# Patient Record
Sex: FEMALE | Employment: OTHER | URBAN - METROPOLITAN AREA
[De-identification: names, ages, dates, MRNs, and addresses within clinical notes are randomized per-mention and may not be internally consistent; named-entity substitution may affect disease eponyms.]

---

## 2020-11-13 ENCOUNTER — HOSPITAL ENCOUNTER (INPATIENT)
Facility: HOSPITAL | Age: 85
LOS: 1 days | Discharge: HOME HEALTH | DRG: 689 | End: 2020-11-14
Attending: HOSPITALIST | Admitting: HOSPITALIST
Payer: MEDICARE

## 2020-11-13 ENCOUNTER — ANESTHESIA EVENT (OUTPATIENT)
Dept: SURGERY | Facility: HOSPITAL | Age: 85
DRG: 689 | End: 2020-11-13
Payer: MEDICARE

## 2020-11-13 ENCOUNTER — ANESTHESIA (OUTPATIENT)
Dept: SURGERY | Facility: HOSPITAL | Age: 85
DRG: 689 | End: 2020-11-13
Payer: MEDICARE

## 2020-11-13 DIAGNOSIS — R10.9 ABDOMINAL PAIN: ICD-10-CM

## 2020-11-13 DIAGNOSIS — N20.1 LEFT URETERAL STONE: Primary | ICD-10-CM

## 2020-11-13 DIAGNOSIS — N20.0 KIDNEY STONE: ICD-10-CM

## 2020-11-13 DIAGNOSIS — I10 HYPERTENSION, UNSPECIFIED TYPE: ICD-10-CM

## 2020-11-13 DIAGNOSIS — N13.8 URINARY TRACT OBSTRUCTION BY KIDNEY STONE: ICD-10-CM

## 2020-11-13 DIAGNOSIS — E11.9 TYPE 2 DIABETES MELLITUS WITHOUT COMPLICATION, WITHOUT LONG-TERM CURRENT USE OF INSULIN: ICD-10-CM

## 2020-11-13 DIAGNOSIS — A41.9 SEPSIS, DUE TO UNSPECIFIED ORGANISM, UNSPECIFIED WHETHER ACUTE ORGAN DYSFUNCTION PRESENT: ICD-10-CM

## 2020-11-13 DIAGNOSIS — N20.0 URINARY TRACT OBSTRUCTION BY KIDNEY STONE: ICD-10-CM

## 2020-11-13 DIAGNOSIS — N12 PYELONEPHRITIS OF LEFT KIDNEY: ICD-10-CM

## 2020-11-13 DIAGNOSIS — N13.4 HYDROURETER ON LEFT: ICD-10-CM

## 2020-11-13 PROBLEM — J96.01 ACUTE RESPIRATORY FAILURE WITH HYPOXIA: Status: ACTIVE | Noted: 2020-11-13

## 2020-11-13 PROBLEM — R06.02 SHORTNESS OF BREATH: Status: ACTIVE | Noted: 2020-11-13

## 2020-11-13 LAB
ALBUMIN SERPL BCP-MCNC: 3.3 G/DL (ref 3.5–5.2)
ALP SERPL-CCNC: 50 U/L (ref 55–135)
ALT SERPL W/O P-5'-P-CCNC: 25 U/L (ref 10–44)
ANION GAP SERPL CALC-SCNC: 9 MMOL/L (ref 8–16)
AST SERPL-CCNC: 25 U/L (ref 10–40)
BASOPHILS # BLD AUTO: 0.07 K/UL (ref 0–0.2)
BASOPHILS NFR BLD: 0.3 % (ref 0–1.9)
BILIRUB SERPL-MCNC: 0.8 MG/DL (ref 0.1–1)
BILIRUB UR QL STRIP: NEGATIVE
BUN SERPL-MCNC: 23 MG/DL (ref 8–23)
CALCIUM SERPL-MCNC: 8.8 MG/DL (ref 8.7–10.5)
CHLORIDE SERPL-SCNC: 102 MMOL/L (ref 95–110)
CLARITY UR REFRACT.AUTO: ABNORMAL
CO2 SERPL-SCNC: 28 MMOL/L (ref 23–29)
COLOR UR AUTO: YELLOW
CREAT SERPL-MCNC: 1.4 MG/DL (ref 0.5–1.4)
DIFFERENTIAL METHOD: ABNORMAL
EOSINOPHIL # BLD AUTO: 0.1 K/UL (ref 0–0.5)
EOSINOPHIL NFR BLD: 0.3 % (ref 0–8)
ERYTHROCYTE [DISTWIDTH] IN BLOOD BY AUTOMATED COUNT: 12.6 % (ref 11.5–14.5)
EST. GFR  (AFRICAN AMERICAN): 39 ML/MIN/1.73 M^2
EST. GFR  (NON AFRICAN AMERICAN): 33.8 ML/MIN/1.73 M^2
ESTIMATED AVG GLUCOSE: 134 MG/DL (ref 68–131)
GLUCOSE SERPL-MCNC: 135 MG/DL (ref 70–110)
GLUCOSE UR QL STRIP: NEGATIVE
HBA1C MFR BLD HPLC: 6.3 % (ref 4–5.6)
HCT VFR BLD AUTO: 41.4 % (ref 37–48.5)
HGB BLD-MCNC: 13.8 G/DL (ref 12–16)
HGB UR QL STRIP: ABNORMAL
IMM GRANULOCYTES # BLD AUTO: 0.13 K/UL (ref 0–0.04)
IMM GRANULOCYTES NFR BLD AUTO: 0.6 % (ref 0–0.5)
INR PPP: 1 (ref 0.8–1.2)
KETONES UR QL STRIP: NEGATIVE
LACTATE SERPL-SCNC: 2.1 MMOL/L (ref 0.5–2.2)
LEUKOCYTE ESTERASE UR QL STRIP: ABNORMAL
LYMPHOCYTES # BLD AUTO: 0.8 K/UL (ref 1–4.8)
LYMPHOCYTES NFR BLD: 3.6 % (ref 18–48)
MCH RBC QN AUTO: 32 PG (ref 27–31)
MCHC RBC AUTO-ENTMCNC: 33.3 G/DL (ref 32–36)
MCV RBC AUTO: 96 FL (ref 82–98)
MICROSCOPIC COMMENT: ABNORMAL
MONOCYTES # BLD AUTO: 1.2 K/UL (ref 0.3–1)
MONOCYTES NFR BLD: 5.3 % (ref 4–15)
NEUTROPHILS # BLD AUTO: 19.5 K/UL (ref 1.8–7.7)
NEUTROPHILS NFR BLD: 89.9 % (ref 38–73)
NITRITE UR QL STRIP: NEGATIVE
NRBC BLD-RTO: 0 /100 WBC
PH UR STRIP: 5 [PH] (ref 5–8)
PLATELET # BLD AUTO: 148 K/UL (ref 150–350)
PMV BLD AUTO: 11.7 FL (ref 9.2–12.9)
POCT GLUCOSE: 116 MG/DL (ref 70–110)
POCT GLUCOSE: 135 MG/DL (ref 70–110)
POCT GLUCOSE: 98 MG/DL (ref 70–110)
POTASSIUM SERPL-SCNC: 3.6 MMOL/L (ref 3.5–5.1)
PROCALCITONIN SERPL IA-MCNC: 4.51 NG/ML
PROT SERPL-MCNC: 6.4 G/DL (ref 6–8.4)
PROT UR QL STRIP: NEGATIVE
PROTHROMBIN TIME: 11.1 SEC (ref 9–12.5)
RBC # BLD AUTO: 4.31 M/UL (ref 4–5.4)
RBC #/AREA URNS AUTO: 62 /HPF (ref 0–4)
SARS-COV-2 RDRP RESP QL NAA+PROBE: NEGATIVE
SODIUM SERPL-SCNC: 139 MMOL/L (ref 136–145)
SP GR UR STRIP: 1.02 (ref 1–1.03)
SQUAMOUS #/AREA URNS AUTO: 1 /HPF
TROPONIN I SERPL DL<=0.01 NG/ML-MCNC: 0.1 NG/ML (ref 0–0.03)
TROPONIN I SERPL DL<=0.01 NG/ML-MCNC: 0.12 NG/ML (ref 0–0.03)
URN SPEC COLLECT METH UR: ABNORMAL
WBC # BLD AUTO: 21.69 K/UL (ref 3.9–12.7)
WBC #/AREA URNS AUTO: 80 /HPF (ref 0–5)

## 2020-11-13 PROCEDURE — 25000003 PHARM REV CODE 250: Performed by: STUDENT IN AN ORGANIZED HEALTH CARE EDUCATION/TRAINING PROGRAM

## 2020-11-13 PROCEDURE — 93010 EKG 12-LEAD: ICD-10-PCS | Mod: ,,, | Performed by: INTERNAL MEDICINE

## 2020-11-13 PROCEDURE — C1758 CATHETER, URETERAL: HCPCS | Performed by: UROLOGY

## 2020-11-13 PROCEDURE — 99223 PR INITIAL HOSPITAL CARE,LEVL III: ICD-10-PCS | Mod: ,,, | Performed by: HOSPITALIST

## 2020-11-13 PROCEDURE — 36000707: Performed by: UROLOGY

## 2020-11-13 PROCEDURE — 52005 CYSTO W/URTRL CATHJ: CPT | Mod: ,,, | Performed by: UROLOGY

## 2020-11-13 PROCEDURE — D9220A PRA ANESTHESIA: ICD-10-PCS | Mod: ANES,,, | Performed by: ANESTHESIOLOGY

## 2020-11-13 PROCEDURE — D9220A PRA ANESTHESIA: ICD-10-PCS | Mod: CRNA,,, | Performed by: NURSE ANESTHETIST, CERTIFIED REGISTERED

## 2020-11-13 PROCEDURE — 37000009 HC ANESTHESIA EA ADD 15 MINS: Performed by: UROLOGY

## 2020-11-13 PROCEDURE — 63600175 PHARM REV CODE 636 W HCPCS: Performed by: STUDENT IN AN ORGANIZED HEALTH CARE EDUCATION/TRAINING PROGRAM

## 2020-11-13 PROCEDURE — 87040 BLOOD CULTURE FOR BACTERIA: CPT | Mod: 59

## 2020-11-13 PROCEDURE — 36415 COLL VENOUS BLD VENIPUNCTURE: CPT

## 2020-11-13 PROCEDURE — 85610 PROTHROMBIN TIME: CPT

## 2020-11-13 PROCEDURE — 52005 PR CYSTOURETHROSCOPY,URETER CATHETER: ICD-10-PCS | Mod: ,,, | Performed by: UROLOGY

## 2020-11-13 PROCEDURE — 85025 COMPLETE CBC W/AUTO DIFF WBC: CPT

## 2020-11-13 PROCEDURE — 82365 CALCULUS SPECTROSCOPY: CPT

## 2020-11-13 PROCEDURE — 71000016 HC POSTOP RECOV ADDL HR: Performed by: UROLOGY

## 2020-11-13 PROCEDURE — 81001 URINALYSIS AUTO W/SCOPE: CPT

## 2020-11-13 PROCEDURE — 93005 ELECTROCARDIOGRAM TRACING: CPT

## 2020-11-13 PROCEDURE — 82962 GLUCOSE BLOOD TEST: CPT | Performed by: UROLOGY

## 2020-11-13 PROCEDURE — 84145 PROCALCITONIN (PCT): CPT

## 2020-11-13 PROCEDURE — 71000044 HC DOSC ROUTINE RECOVERY FIRST HOUR: Performed by: UROLOGY

## 2020-11-13 PROCEDURE — 83605 ASSAY OF LACTIC ACID: CPT

## 2020-11-13 PROCEDURE — 80053 COMPREHEN METABOLIC PANEL: CPT

## 2020-11-13 PROCEDURE — 37000008 HC ANESTHESIA 1ST 15 MINUTES: Performed by: UROLOGY

## 2020-11-13 PROCEDURE — 99223 1ST HOSP IP/OBS HIGH 75: CPT | Mod: ,,, | Performed by: HOSPITALIST

## 2020-11-13 PROCEDURE — 74420 PR  X-RAY RETROGRADE PYELOGRAM: ICD-10-PCS | Mod: 26,,, | Performed by: UROLOGY

## 2020-11-13 PROCEDURE — 84484 ASSAY OF TROPONIN QUANT: CPT

## 2020-11-13 PROCEDURE — 93010 ELECTROCARDIOGRAM REPORT: CPT | Mod: ,,, | Performed by: INTERNAL MEDICINE

## 2020-11-13 PROCEDURE — 36000706: Performed by: UROLOGY

## 2020-11-13 PROCEDURE — 71000015 HC POSTOP RECOV 1ST HR: Performed by: UROLOGY

## 2020-11-13 PROCEDURE — 20600001 HC STEP DOWN PRIVATE ROOM

## 2020-11-13 PROCEDURE — U0002 COVID-19 LAB TEST NON-CDC: HCPCS

## 2020-11-13 PROCEDURE — D9220A PRA ANESTHESIA: Mod: CRNA,,, | Performed by: NURSE ANESTHETIST, CERTIFIED REGISTERED

## 2020-11-13 PROCEDURE — C1769 GUIDE WIRE: HCPCS | Performed by: UROLOGY

## 2020-11-13 PROCEDURE — D9220A PRA ANESTHESIA: Mod: ANES,,, | Performed by: ANESTHESIOLOGY

## 2020-11-13 PROCEDURE — 25000003 PHARM REV CODE 250: Performed by: NURSE ANESTHETIST, CERTIFIED REGISTERED

## 2020-11-13 PROCEDURE — 87086 URINE CULTURE/COLONY COUNT: CPT

## 2020-11-13 PROCEDURE — 83036 HEMOGLOBIN GLYCOSYLATED A1C: CPT

## 2020-11-13 PROCEDURE — 84484 ASSAY OF TROPONIN QUANT: CPT | Mod: 91

## 2020-11-13 PROCEDURE — 63600175 PHARM REV CODE 636 W HCPCS: Performed by: NURSE ANESTHETIST, CERTIFIED REGISTERED

## 2020-11-13 PROCEDURE — 74420 UROGRAPHY RTRGR +-KUB: CPT | Mod: 26,,, | Performed by: UROLOGY

## 2020-11-13 RX ORDER — IBUPROFEN 200 MG
400 TABLET ORAL
COMMUNITY

## 2020-11-13 RX ORDER — GLUCAGON 1 MG
1 KIT INJECTION
Status: DISCONTINUED | OUTPATIENT
Start: 2020-11-13 | End: 2020-11-14 | Stop reason: HOSPADM

## 2020-11-13 RX ORDER — FENTANYL CITRATE 50 UG/ML
INJECTION, SOLUTION INTRAMUSCULAR; INTRAVENOUS
Status: DISCONTINUED | OUTPATIENT
Start: 2020-11-13 | End: 2020-11-13

## 2020-11-13 RX ORDER — POLYETHYLENE GLYCOL 3350 17 G/17G
17 POWDER, FOR SOLUTION ORAL DAILY
Status: DISCONTINUED | OUTPATIENT
Start: 2020-11-13 | End: 2020-11-14 | Stop reason: HOSPADM

## 2020-11-13 RX ORDER — FENTANYL CITRATE 50 UG/ML
25 INJECTION, SOLUTION INTRAMUSCULAR; INTRAVENOUS EVERY 5 MIN PRN
Status: DISCONTINUED | OUTPATIENT
Start: 2020-11-13 | End: 2020-11-13 | Stop reason: HOSPADM

## 2020-11-13 RX ORDER — SODIUM CHLORIDE 0.9 % (FLUSH) 0.9 %
10 SYRINGE (ML) INJECTION
Status: DISCONTINUED | OUTPATIENT
Start: 2020-11-13 | End: 2020-11-14 | Stop reason: HOSPADM

## 2020-11-13 RX ORDER — MONTELUKAST SODIUM 10 MG/1
10 TABLET ORAL NIGHTLY
COMMUNITY

## 2020-11-13 RX ORDER — GABAPENTIN 300 MG/1
300 CAPSULE ORAL DAILY
COMMUNITY

## 2020-11-13 RX ORDER — VERAPAMIL HYDROCHLORIDE 180 MG/1
180 TABLET, FILM COATED, EXTENDED RELEASE ORAL DAILY
COMMUNITY

## 2020-11-13 RX ORDER — AMOXICILLIN 500 MG
CAPSULE ORAL DAILY
COMMUNITY

## 2020-11-13 RX ORDER — PROPOFOL 10 MG/ML
VIAL (ML) INTRAVENOUS CONTINUOUS PRN
Status: DISCONTINUED | OUTPATIENT
Start: 2020-11-13 | End: 2020-11-13

## 2020-11-13 RX ORDER — SODIUM CHLORIDE 9 MG/ML
INJECTION, SOLUTION INTRAVENOUS CONTINUOUS
Status: ACTIVE | OUTPATIENT
Start: 2020-11-13 | End: 2020-11-13

## 2020-11-13 RX ORDER — DICLOFENAC SODIUM 10 MG/G
4 GEL TOPICAL 3 TIMES DAILY
Status: DISCONTINUED | OUTPATIENT
Start: 2020-11-13 | End: 2020-11-14 | Stop reason: HOSPADM

## 2020-11-13 RX ORDER — PRAVASTATIN SODIUM 20 MG/1
20 TABLET ORAL DAILY
COMMUNITY

## 2020-11-13 RX ORDER — IBUPROFEN 200 MG
16 TABLET ORAL
Status: DISCONTINUED | OUTPATIENT
Start: 2020-11-13 | End: 2020-11-14 | Stop reason: HOSPADM

## 2020-11-13 RX ORDER — TRIAMTERENE AND HYDROCHLOROTHIAZIDE 75; 50 MG/1; MG/1
1 TABLET ORAL DAILY
COMMUNITY

## 2020-11-13 RX ORDER — ONDANSETRON 2 MG/ML
4 INJECTION INTRAMUSCULAR; INTRAVENOUS DAILY PRN
Status: DISCONTINUED | OUTPATIENT
Start: 2020-11-13 | End: 2020-11-13 | Stop reason: HOSPADM

## 2020-11-13 RX ORDER — ASPIRIN 81 MG/1
81 TABLET ORAL DAILY
COMMUNITY

## 2020-11-13 RX ORDER — PROPOFOL 10 MG/ML
VIAL (ML) INTRAVENOUS
Status: DISCONTINUED | OUTPATIENT
Start: 2020-11-13 | End: 2020-11-13

## 2020-11-13 RX ORDER — CETIRIZINE HYDROCHLORIDE 5 MG/1
5 TABLET ORAL DAILY PRN
Status: DISCONTINUED | OUTPATIENT
Start: 2020-11-13 | End: 2020-11-14 | Stop reason: HOSPADM

## 2020-11-13 RX ORDER — ACETAMINOPHEN 500 MG
1000 TABLET ORAL NIGHTLY
COMMUNITY

## 2020-11-13 RX ORDER — INSULIN ASPART 100 [IU]/ML
0-5 INJECTION, SOLUTION INTRAVENOUS; SUBCUTANEOUS
Status: DISCONTINUED | OUTPATIENT
Start: 2020-11-13 | End: 2020-11-14 | Stop reason: HOSPADM

## 2020-11-13 RX ORDER — GLYBURIDE 2.5 MG/1
2.5 TABLET ORAL DAILY
COMMUNITY

## 2020-11-13 RX ORDER — ONDANSETRON 2 MG/ML
4 INJECTION INTRAMUSCULAR; INTRAVENOUS EVERY 8 HOURS PRN
Status: DISCONTINUED | OUTPATIENT
Start: 2020-11-13 | End: 2020-11-14 | Stop reason: HOSPADM

## 2020-11-13 RX ORDER — LIDOCAINE HCL/PF 100 MG/5ML
SYRINGE (ML) INTRAVENOUS
Status: DISCONTINUED | OUTPATIENT
Start: 2020-11-13 | End: 2020-11-13

## 2020-11-13 RX ORDER — IBUPROFEN 200 MG
24 TABLET ORAL
Status: DISCONTINUED | OUTPATIENT
Start: 2020-11-13 | End: 2020-11-14 | Stop reason: HOSPADM

## 2020-11-13 RX ORDER — GABAPENTIN 300 MG/1
300 CAPSULE ORAL 3 TIMES DAILY
Status: DISCONTINUED | OUTPATIENT
Start: 2020-11-13 | End: 2020-11-14 | Stop reason: HOSPADM

## 2020-11-13 RX ORDER — DIPHENHYDRAMINE HCL 25 MG
25 CAPSULE ORAL
COMMUNITY

## 2020-11-13 RX ORDER — ACETAMINOPHEN 325 MG/1
650 TABLET ORAL EVERY 4 HOURS PRN
Status: DISCONTINUED | OUTPATIENT
Start: 2020-11-13 | End: 2020-11-14 | Stop reason: HOSPADM

## 2020-11-13 RX ADMIN — DICLOFENAC 4 G: 10 GEL TOPICAL at 10:11

## 2020-11-13 RX ADMIN — ACETAMINOPHEN 650 MG: 325 TABLET ORAL at 05:11

## 2020-11-13 RX ADMIN — PIPERACILLIN AND TAZOBACTAM 4.5 G: 4; .5 INJECTION, POWDER, LYOPHILIZED, FOR SOLUTION INTRAVENOUS; PARENTERAL at 10:11

## 2020-11-13 RX ADMIN — GABAPENTIN 300 MG: 300 CAPSULE ORAL at 10:11

## 2020-11-13 RX ADMIN — PROPOFOL 10 MG: 10 INJECTION, EMULSION INTRAVENOUS at 03:11

## 2020-11-13 RX ADMIN — PIPERACILLIN AND TAZOBACTAM 4.5 G: 4; .5 INJECTION, POWDER, LYOPHILIZED, FOR SOLUTION INTRAVENOUS; PARENTERAL at 03:11

## 2020-11-13 RX ADMIN — LIDOCAINE HYDROCHLORIDE 20 MG: 20 INJECTION, SOLUTION INTRAVENOUS at 03:11

## 2020-11-13 RX ADMIN — SODIUM CHLORIDE: 0.9 INJECTION, SOLUTION INTRAVENOUS at 05:11

## 2020-11-13 RX ADMIN — PROPOFOL 100 MCG/KG/MIN: 10 INJECTION, EMULSION INTRAVENOUS at 03:11

## 2020-11-13 RX ADMIN — GABAPENTIN 300 MG: 300 CAPSULE ORAL at 09:11

## 2020-11-13 RX ADMIN — PIPERACILLIN AND TAZOBACTAM 4.5 G: 4; .5 INJECTION, POWDER, LYOPHILIZED, FOR SOLUTION INTRAVENOUS; PARENTERAL at 05:11

## 2020-11-13 RX ADMIN — FENTANYL CITRATE 25 MCG: 50 INJECTION, SOLUTION INTRAMUSCULAR; INTRAVENOUS at 03:11

## 2020-11-13 RX ADMIN — POLYETHYLENE GLYCOL 3350 17 G: 17 POWDER, FOR SOLUTION ORAL at 09:11

## 2020-11-13 RX ADMIN — PROPOFOL 20 MG: 10 INJECTION, EMULSION INTRAVENOUS at 03:11

## 2020-11-13 RX ADMIN — GUAIFENESIN AND DEXTROMETHORPHAN HYDROBROMIDE 1 TABLET: 600; 30 TABLET, EXTENDED RELEASE ORAL at 10:11

## 2020-11-13 NOTE — ASSESSMENT & PLAN NOTE
On arrival to floor, hypoxic to 92%, up to mid 94% on 1L NC. Trop 0.103, however EKG in sinus becka with 1st deg AV block without ST elevations. Likely 2/2 demand ischemia given hypotension at OSH. Procal elevated 4.57. CXR w/o concern for PNA.  Not requiring supplemental O2 to maintain adequate saturation at discharge.    Resolved.

## 2020-11-13 NOTE — HPI
Aidan Santa is an 87 y.o. female with a history of HTN, DM, and HLD who presented to [place] on 11/12/20 with LLQ and L flank pain over the past few days.  Patient states that she has had a 1 day history of sharp, intermittent left flank pain radiating to the LLQ. She has had fevers but no chills. She endorses nausea but no vomiting. CT a/p w/ contrast showed a 3 mm left UVJ stone with proximal hydroureteronephrosis w/ delayed nephrogram and concern for forniceal rupture. At [facility], WBC 16, Cr 1.2, lactate 3.2, trop 0.026, UA negative for nitrites and showed 15 RBC/hpf, 30 WBC/hpf, moderate bacteria, and occasional squams. She then started to become hypotensive and did respond to fluids but then eventually required levophed initiation. She quickly improved and was taken off levophed and at the time transfer acceptance. She was also given a dose of ceftriaxone. She was transferred to [facility] on 11/13 for urology evaluation. At [facility], her WBC is up to 21.7, Cr stable, lactate normal, trop up to 0.103, and procal 4.51. She is currently afebrile and hemodynamically stable. Last took ASA 81 yesterday.

## 2020-11-13 NOTE — ASSESSMENT & PLAN NOTE
"Mrs. Aidan Santa is a 87-year-old-woman with T2DM, HTN, HLD, and asthma, who initially presented to [facility] in [place], [place] with 10/10 LLQ abdominal pain and L flank pain.    Per transfer note: At OSH,"Work up revealed on CT a 3 mm obstructing stone in the L UVJ and perinephric fluids that is concerning for recent rupture of the calyx or renal pelvis. She then started to become hypotensive and did respond to fluids but then eventually required levophed initiation. She quickly improved and was taken off levophed and at the time of this transfer acceptance she has been off levophed for about 1.5 hrs with BPs maintaining in the 120s and HRs in the 70s-80s range. The case was also discussed with eicu and it was felt that she would be stable for a stepdown bed at this time. She was also given a dose of ceftriaxone. Patient overall appears well. Urology on call aware and will consult."    Resolved.    - Blood and urine cx: no growth   - IV Zosyn 11/13 - 11/14  - Ciprofloxacin for 7 days post discharge  - No urologic intervention required  "

## 2020-11-13 NOTE — SUBJECTIVE & OBJECTIVE
No past medical history on file.    No past surgical history on file.    Review of patient's allergies indicates:   Allergen Reactions    Codeine Other (See Comments)     dizzy       Family History     None          Tobacco Use    Smoking status: Not on file   Substance and Sexual Activity    Alcohol use: Not on file    Drug use: Not on file    Sexual activity: Not on file       Review of Systems   Constitutional: Positive for chills. Negative for appetite change and fever.   HENT: Negative.    Eyes: Negative.    Respiratory: Positive for shortness of breath (Chronic). Negative for chest tightness.    Cardiovascular: Negative for chest pain and palpitations.   Gastrointestinal: Positive for abdominal pain and nausea. Negative for vomiting.   Genitourinary: Positive for flank pain. Negative for decreased urine volume and difficulty urinating.   Musculoskeletal: Negative for arthralgias and gait problem.   Skin: Negative for color change and rash.   Neurological: Negative for dizziness and headaches.   Psychiatric/Behavioral: Negative for agitation and confusion.       Objective:     Temp:  [98.3 °F (36.8 °C)-99 °F (37.2 °C)] 98.3 °F (36.8 °C)  Pulse:  [] 82  Resp:  [19-22] 20  SpO2:  [92 %-95 %] 94 %  BP: ()/(50-68) 150/68     Body mass index is 33.35 kg/m².           Drains     None                 Physical Exam  Vitals signs and nursing note reviewed.   Constitutional:       Appearance: Normal appearance.   HENT:      Head: Normocephalic and atraumatic.      Comments: On nasal cannula     Mouth/Throat:      Mouth: Mucous membranes are moist.   Eyes:      Pupils: Pupils are equal, round, and reactive to light.   Cardiovascular:      Rate and Rhythm: Normal rate.   Pulmonary:      Effort: Pulmonary effort is normal. No respiratory distress.   Abdominal:      General: Abdomen is flat. There is no distension.      Tenderness: There is abdominal tenderness (LLQ). There is left CVA tenderness.   Skin:      General: Skin is warm and dry.   Neurological:      General: No focal deficit present.      Mental Status: She is alert and oriented to person, place, and time.   Psychiatric:         Mood and Affect: Mood normal.         Behavior: Behavior normal.         Thought Content: Thought content normal.         Judgment: Judgment normal.         Significant Labs:    BMP:  Recent Labs   Lab 11/13/20 0455      K 3.6      CO2 28   BUN 23   CREATININE 1.4   CALCIUM 8.8       CBC:  Recent Labs   Lab 11/13/20 0455   WBC 21.69*   HGB 13.8   HCT 41.4   *       All pertinent labs results from the past 24 hours have been reviewed.    Significant Imaging: See HPI

## 2020-11-13 NOTE — ASSESSMENT & PLAN NOTE
She takes Triamterene-HCTZ 75-50 mg daily and verapamil 180 mg daily for hypertension.     - Held during admission

## 2020-11-13 NOTE — OP NOTE
[facility]  Operative Note    Date: 11/13/2020    Pre-Op Diagnosis: left ureteral stone, UTI    Patient Active Problem List    Diagnosis Date Noted    Urinary tract obstruction by kidney stone 11/13/2020    Left ureteral stone 11/13/2020    Diabetes 11/13/2020    Shortness of breath 11/13/2020    Hypertension 11/13/2020         Post-Op Diagnosis: same (stone passed prior to procedure)    Procedure(s) Performed:  1. Cystoscopy with left RGP  2. Fluoro < 1 h    Specimen(s): none    Staff Surgeon: Rolando Arellano MD    Assistant Surgeon: Mahamed Jama MD; Jennifer Pendleton MD    Anesthesia: Monitored Local Anesthesia with Sedation    Indications: Aidan Santa is a 87 y.o. female with left ureteral stone and UTI, admitted to Hospital Medicine     Findings:   - stone found when prepping patient's vulva  - left RPG showed no filling defects; stone therefore has presumably passed, presumably was the stone identified when prepping patient  - multiple small brown lesions in bladder consistent with cystitis cystica visualized    Estimated Blood Loss: min    Drains: none    Specimen: left ureteral stone    Procedure in Detail:  After risks, benefits and possible complications of the procedure were explained, the patient elected to undergo the procedure and informed consent was obtained. All questions were answered in the sanjuana-operative area. The patient was transferred to the cystoscopy suite and placed in the supine position. SCDs were applied and working. Anesthesia was administered. Once the patient was adequately sedated, she was placed in the dorsal lithotomy position and prepped and draped in the usual sterile fashion. While prepping the patient, a stone was seen adherent to the vulva. This was sent for analysis. Time out was performed, sanjuana-procedural antibiotics were confirmed.     A rigid cystoscope in a 22 Fr sheath was introduced into the bladder per urethra. This passed easily. The  entire urethra was visualized which showed no masses or strictures. The right and left ureteral orifices were identified in the normal anatomic position and were seen effluxing clear urine. Formal cystoscopy using both 30 and 70 degree lens was performed which revealed no masses or lesions suspicious for malignancy, no trabeculations, no bladder stones and no bladder diverticuli. There were brown spots throughout the bladder consistent with cystitis cystica.    A 5 Fr open-ended catheter was used to perform a left RGP. This revealed no filling defects and delicate calyces. The bladder was drained, and the patient was removed from lithotomy.     The patient tolerated the procedure well and was transferred to recovery in stable condition.    Disposition: The patient will return to the care of the Hospital Medicine service.  She should continue treatment of her pyelonephritis, but now that her stone has passed there is no need for urologic intervention. Urology will sign off. Please contact with questions or concerns.     Mahamed Jama MD

## 2020-11-13 NOTE — TRANSFER OF CARE
"Anesthesia Transfer of Care Note    Patient: Aidan Santa    Procedure(s) Performed: Procedure(s) (LRB):  CYSTOSCOPY, WITH URETERAL STENT INSERTION (Left)  PYELOGRAM, RETROGRADE (Left)    Patient location: [facility]    Anesthesia Type: general    Transport from OR: Transported from OR on 6-10 L/min O2 by face mask with adequate spontaneous ventilation    Post pain: adequate analgesia    Post assessment: tolerated procedure well and no apparent anesthetic complications    Post vital signs: stable    Level of consciousness: awake, alert and oriented    Nausea/Vomiting: no nausea/vomiting    Complications: none    Transfer of care protocol was followed      Last vitals:   Visit Vitals  /72 (Patient Position: Lying)   Pulse 85   Temp 36.8 °C (98.2 °F) (Oral)   Resp 18   Ht 5' 5" (1.651 m)   Wt 90.9 kg (200 lb 6.4 oz)   SpO2 (!) 91%   BMI 33.35 kg/m²     "

## 2020-11-13 NOTE — PLAN OF CARE
POC reviewed with pt. Who verbalized understanding. AAOx 4. Remains free of falls and injuries. VSS. NPO, denies nausea. Pain controlled with Tylenol. Assist with activity. BG monitored at night time with no coverage needed. Belongings @ bedside. UA sent. XRAY. EKG. LABS.1L NC. IS @ bedside. Suction/O2 in room.No acute events. No distress noted. WCTM.

## 2020-11-13 NOTE — PLAN OF CARE
(Physician in Lead of Transfers)   Outside Transfer Acceptance Note / Regional Referral Center      Upon patient arrival to floor, please call extension [phone] (if no answer, this will flip to a beeper, so enter your call back number) for Hospital Medicine admit team assignment and for additional admit orders for the patient.  Do not page the attending physician associated with the patient on arrival (this physician may not be on duty at the time of arrival).  Rather, always call [phone] to reach the triage physician for orders and team assignment.      Transferring Physician: Dr. Fischer    Accepting Physician: Aayush Escalona MD    Date of Acceptance: 11/12/2020    Transferring Facility: [facility]     Reason for Transfer: needs urology evaluation     Report from Transferring Physician/Hospital course: The patient is an 86 y/o female with PMH of HTN, DM, and HLP who presented with LLQ and L flank pain over the past few days. Work up revealed on CT a 3 mm obstructing stone in the L UVJ and perinephric fluids that is concerning for recent rupture of the calyx or renal pelvis. She then started to become hypotensive and did respond to fluids but then eventually required levophed initiation. She quickly improved and was taken off levophed and at the time of this transfer acceptance she has been off levophed for about 1.5 hrs with BPs maintaining in the 120s and HRs in the 70s-80s range. The case was also discussed with eicu and it was felt that she would be stable for a stepdown bed at this time. She was also given a dose of ceftriaxone. Patient overall appears well. Urology on call aware and will consult.       Labs & Radiographs: see transfer records      To Do List:   1) Admit to stepdown bed  2) Will need rapid covid testing   3) Urology consult  4) Continue IVF and antibiotics      Aayush Escalona MD  Hospital Medicine Staff

## 2020-11-13 NOTE — PLAN OF CARE
POC reviewed with pt and daughters. Cystoscope today without intervention s/t renal stone passing on its own. Pain controlled. Diabetic diet. Pt ambulating with assistance, on 2L NC. Will continue to monitor.

## 2020-11-13 NOTE — ASSESSMENT & PLAN NOTE
Aidan Santa is an 87 y.o. female with a history of HTN, DM, and HLD who presented to [facility] on 11/12/20 due to 3 mm left UVJ stone and concern for obstructive pyelonephritis.    - Patient needs drainage of her left kidney and ureter.  - To OR today for cystoscopy with left ureteral stent placement, pending primary team clearance for general anesthesia given age and elevated troponins.  - Consent signed. I have explained the indications, risks, benefits, and alternatives of the procedure in detail. The patient voices understanding and all questions have been answered. The patient agrees to proceed as planned. She understands that there is a potential that if retrograde access is not possible, she would need a left nephrostomy tube.  - Keep NPO.  - Continue empiric ceftriaxone, follow up urine and blood cultures.  - Will discuss definitive management of her stone on an outpatient basis once infection is cleared.

## 2020-11-13 NOTE — CONSULTS
[facility]  Urology  Consult Note    Patient Name: Aidan Santa  MRN: [id number]  Admission Date: 11/13/2020  Hospital Length of Stay: 0   Code Status: Full Code   Attending Provider: Eli Thornton MD   Consulting Provider: Luke Khoury MD  Primary Care Physician: To Obtain Unable  Principal Problem:Left ureteral stone    Inpatient consult to Urology  Consult performed by: Luke Khoury MD  Consult ordered by: Alysha Bray MD        Subjective:     HPI:  Aidan Santa is an 87 y.o. female with a history of HTN, DM, and HLD who presented to [facility] on 11/12/20 with LLQ and L flank pain over the past few days.  Patient states that she has had a 1 day history of sharp, intermittent left flank pain radiating to the LLQ. She has had fevers but no chills. She endorses nausea but no vomiting. CT a/p w/ contrast showed a 3 mm left UVJ stone with proximal hydroureteronephrosis w/ delayed nephrogram and concern for forniceal rupture. At [facility], WBC 16, Cr 1.2, lactate 3.2, trop 0.026, UA negative for nitrites and showed 15 RBC/hpf, 30 WBC/hpf, moderate bacteria, and occasional squams. She then started to become hypotensive and did respond to fluids but then eventually required levophed initiation. She quickly improved and was taken off levophed and at the time transfer acceptance. She was also given a dose of ceftriaxone. She was transferred to [facility] on 11/13 for urology evaluation. At [facility], her WBC is up to 21.7, Cr stable, lactate normal, trop up to 0.103, and procal 4.51. She is currently afebrile and hemodynamically stable. Last took ASA 81 yesterday.    No past medical history on file.    No past surgical history on file.    Review of patient's allergies indicates:   Allergen Reactions    Codeine Other (See Comments)     dizzy       Family History     None          Tobacco Use    Smoking status: Not on file   Substance and Sexual Activity    Alcohol use: Not  on file    Drug use: Not on file    Sexual activity: Not on file       Review of Systems   Constitutional: Positive for chills. Negative for appetite change and fever.   HENT: Negative.    Eyes: Negative.    Respiratory: Positive for shortness of breath (Chronic). Negative for chest tightness.    Cardiovascular: Negative for chest pain and palpitations.   Gastrointestinal: Positive for abdominal pain and nausea. Negative for vomiting.   Genitourinary: Positive for flank pain. Negative for decreased urine volume and difficulty urinating.   Musculoskeletal: Negative for arthralgias and gait problem.   Skin: Negative for color change and rash.   Neurological: Negative for dizziness and headaches.   Psychiatric/Behavioral: Negative for agitation and confusion.       Objective:     Temp:  [98.3 °F (36.8 °C)-99 °F (37.2 °C)] 98.3 °F (36.8 °C)  Pulse:  [] 82  Resp:  [19-22] 20  SpO2:  [92 %-95 %] 94 %  BP: ()/(50-68) 150/68     Body mass index is 33.35 kg/m².           Drains     None                 Physical Exam  Vitals signs and nursing note reviewed.   Constitutional:       Appearance: Normal appearance.   HENT:      Head: Normocephalic and atraumatic.      Comments: On nasal cannula     Mouth/Throat:      Mouth: Mucous membranes are moist.   Eyes:      Pupils: Pupils are equal, round, and reactive to light.   Cardiovascular:      Rate and Rhythm: Normal rate.   Pulmonary:      Effort: Pulmonary effort is normal. No respiratory distress.   Abdominal:      General: Abdomen is flat. There is no distension.      Tenderness: There is abdominal tenderness (LLQ). There is left CVA tenderness.   Skin:     General: Skin is warm and dry.   Neurological:      General: No focal deficit present.      Mental Status: She is alert and oriented to person, place, and time.   Psychiatric:         Mood and Affect: Mood normal.         Behavior: Behavior normal.         Thought Content: Thought content normal.          Judgment: Judgment normal.         Significant Labs:    BMP:  Recent Labs   Lab 11/13/20  0455      K 3.6      CO2 28   BUN 23   CREATININE 1.4   CALCIUM 8.8       CBC:  Recent Labs   Lab 11/13/20  0455   WBC 21.69*   HGB 13.8   HCT 41.4   *       All pertinent labs results from the past 24 hours have been reviewed.    Significant Imaging: See HPI      Assessment and Plan:     * Left ureteral stone  Aidan Santa is an 87 y.o. female with a history of HTN, DM, and HLD who presented to [facility] on 11/12/20 due to 3 mm left UVJ stone and concern for obstructive pyelonephritis. She was transferred to [facility] on 11/13 for urology evaluation.    - Patient needs drainage of her left kidney and ureter.  - To OR today for cystoscopy with left ureteral stent placement, pending primary team clearance for general anesthesia given age and elevated troponins.  - Consent signed. I have explained the indications, risks, benefits, and alternatives of the procedure in detail. The patient voices understanding and all questions have been answered. The patient agrees to proceed as planned. She understands that there is a potential that if retrograde access is not possible, she would need a left nephrostomy tube.  - Keep NPO.  - Continue empiric ceftriaxone, follow up urine and blood cultures.  - Will discuss definitive management of her stone on an outpatient basis once infection is cleared.  - Will have outside imaging disc uploaded to Epic.      VTE Risk Mitigation (From admission, onward)         Ordered     IP VTE HIGH RISK PATIENT  Once      11/13/20 0519     Place sequential compression device  Until discontinued      11/13/20 0519                Thank you for your consult. I will follow-up with patient. Please contact us if you have any additional questions.    Luke Khoury MD  Urology  [facility]

## 2020-11-13 NOTE — SUBJECTIVE & OBJECTIVE
No past medical history on file.    No past surgical history on file.    Review of patient's allergies indicates:   Allergen Reactions    Codeine Other (See Comments)     dizzy       No current facility-administered medications on file prior to encounter.      Current Outpatient Medications on File Prior to Encounter   Medication Sig    aspirin (ECOTRIN) 81 MG EC tablet Take 81 mg by mouth once daily.    diphenhydrAMINE (BENADRYL) 25 mg capsule Take 25 mg by mouth as needed for Itching.    gabapentin (NEURONTIN) 300 MG capsule Take 300 mg by mouth 3 (three) times daily.    glyBURIDE (DIABETA) 2.5 MG tablet Take 2.5 mg by mouth 2 (two) times daily.    ibuprofen (ADVIL,MOTRIN) 200 MG tablet Take 200 mg by mouth as needed for Pain.    montelukast (SINGULAIR) 10 mg tablet Take 10 mg by mouth every evening.    omega-3 fatty acids/fish oil (FISH OIL-OMEGA-3 FATTY ACIDS) 300-1,000 mg capsule Take by mouth once daily.    pravastatin (PRAVACHOL) 20 MG tablet Take 20 mg by mouth once daily.    triamterene-hydrochlorothiazide 75-50 mg (MAXZIDE) 75-50 mg per tablet Take 1 tablet by mouth once daily.    verapamiL (VERELAN) 100 MG CPCT Take 180 mg by mouth once daily.     Family History     None        Tobacco Use    Smoking status: Not on file   Substance and Sexual Activity    Alcohol use: Not on file    Drug use: Not on file    Sexual activity: Not on file     Review of Systems   Constitutional: Positive for chills. Negative for fever.   HENT: Negative for congestion, rhinorrhea and sore throat.    Eyes: Negative for redness and visual disturbance.   Respiratory: Positive for shortness of breath. Negative for cough, wheezing and stridor.    Cardiovascular: Negative for chest pain and leg swelling.   Gastrointestinal: Positive for abdominal distention, abdominal pain (LLQ) and nausea. Negative for diarrhea and vomiting.   Endocrine: Negative for polyuria.   Genitourinary: Positive for flank pain (L) and frequency  (decreased). Negative for dysuria.   Musculoskeletal: Negative for arthralgias and back pain.   Skin: Negative for rash.   Neurological: Negative for weakness, light-headedness and headaches.   Hematological: Does not bruise/bleed easily.   Psychiatric/Behavioral: Negative for agitation and confusion.     Objective:     Vital Signs (Most Recent):  Temp: 98.3 °F (36.8 °C) (11/13/20 0348)  Pulse: 82 (11/13/20 0348)  Resp: 20 (11/13/20 0348)  BP: (!) 150/68 (11/13/20 0348)  SpO2: (!) 94 % (11/13/20 0400) Vital Signs (24h Range):  Temp:  [98.3 °F (36.8 °C)-99 °F (37.2 °C)] 98.3 °F (36.8 °C)  Pulse:  [] 82  Resp:  [19-22] 20  SpO2:  [92 %-95 %] 94 %  BP: ()/(50-68) 150/68     Weight: 90.9 kg (200 lb 6.4 oz)  Body mass index is 33.35 kg/m².    Physical Exam  Constitutional:       General: She is not in acute distress.     Appearance: She is obese. She is not ill-appearing, toxic-appearing or diaphoretic.   HENT:      Head: Normocephalic and atraumatic.      Nose: No congestion or rhinorrhea.      Mouth/Throat:      Mouth: Mucous membranes are dry.      Pharynx: No oropharyngeal exudate.   Eyes:      General: No scleral icterus.     Conjunctiva/sclera: Conjunctivae normal.   Neck:      Musculoskeletal: Normal range of motion. No neck rigidity.   Cardiovascular:      Rate and Rhythm: Normal rate and regular rhythm.      Pulses: Normal pulses.      Heart sounds: Normal heart sounds. No murmur. No friction rub. No gallop.    Pulmonary:      Effort: Pulmonary effort is normal. No respiratory distress.      Breath sounds: Normal breath sounds. No stridor. No wheezing, rhonchi or rales.   Abdominal:      General: Abdomen is flat. Bowel sounds are normal. There is distension.      Palpations: Abdomen is soft.      Tenderness: There is abdominal tenderness (LLQ). There is left CVA tenderness. There is no right CVA tenderness, guarding or rebound.   Musculoskeletal: Normal range of motion.      Right lower leg: No  edema.      Left lower leg: No edema.   Skin:     Coloration: Skin is not pale.      Findings: No erythema.   Neurological:      General: No focal deficit present.      Mental Status: She is alert and oriented to person, place, and time. Mental status is at baseline.      Sensory: No sensory deficit.      Motor: No weakness.   Psychiatric:         Mood and Affect: Mood normal.         Behavior: Behavior normal.             Significant Labs:   CBC:   Recent Labs   Lab 11/13/20  0455   WBC 21.69*   HGB 13.8   HCT 41.4   *     CMP:   Recent Labs   Lab 11/13/20  0455      K 3.6      CO2 28   *   BUN 23   CREATININE 1.4   CALCIUM 8.8   PROT 6.4   ALBUMIN 3.3*   BILITOT 0.8   ALKPHOS 50*   AST 25   ALT 25   ANIONGAP 9   EGFRNONAA 33.8*     Coagulation:   Recent Labs   Lab 11/13/20  0455   INR 1.0     Lactic Acid:   Recent Labs   Lab 11/13/20  0455   LACTATE 2.1     Urine Studies:   Recent Labs   Lab 11/13/20  0607   COLORU Yellow   APPEARANCEUA Hazy*   PHUR 5.0   SPECGRAV 1.020   PROTEINUA Negative   GLUCUA Negative   KETONESU Negative   BILIRUBINUA Negative   OCCULTUA 3+*   NITRITE Negative   LEUKOCYTESUR 3+*   RBCUA 62*   WBCUA 80*   SQUAMEPITHEL 1     Trop elevated at 0.103.     EKG in sinus rhythm with 1st degree AV block.    Significant Imaging: I have reviewed all pertinent imaging results/findings within the past 24 hours.

## 2020-11-13 NOTE — ASSESSMENT & PLAN NOTE
Aidan Santa is an 87 y.o. female with a history of HTN, DM, and HLD who presented to [facility] on 11/12/20 due to 3 mm left UVJ stone and concern for obstructive pyelonephritis. She was transferred to [facility] on 11/13 for urology evaluation.    - Patient needs drainage of her left kidney and ureter.  - To OR today for cystoscopy with left ureteral stent placement, pending primary team clearance for general anesthesia given age and elevated troponins.  - Consent signed. I have explained the indications, risks, benefits, and alternatives of the procedure in detail. The patient voices understanding and all questions have been answered. The patient agrees to proceed as planned. She understands that there is a potential that if retrograde access is not possible, she would need a left nephrostomy tube.  - Keep NPO.  - Continue empiric ceftriaxone, follow up urine and blood cultures.  - Will discuss definitive management of her stone on an outpatient basis once infection is cleared.

## 2020-11-13 NOTE — PLAN OF CARE
Surgical Risk Assessment   Active cardiac issues:  Active decompensated heart failure? No   Unstable angina?  No   Significant uncontrolled arrhythmias? No   Severe valvular heart disease-Aortic or Mitral Stenosis? No   Recent MI or coronary revascularization < 30 days? No     Cardiac Risk Factors  History of CAD/ischemic heart disease? No   History of cerebrovascular disease?   No   History of congestive heart failure? No   Type 2 diabetes requiring insulin? No   Serum Creatinine > 2? No   Total cardiac risk factors 0   Add 1 point for high risk surgery (intraperitoneal, intrathoracic, or suprainguinal vascular)  Class I Risk (0 points): 3.9%  Class II Risk (1 point): 6.0%  Class III Risk (2 points): 10.1%  Class IV Risk (3+ points): 15.0%    Revised cardiac risk index is 3.9% for 30-day risk of death, MI, or cardiac arrest.       Functional mets >4    < 4* METs -unable to walk > 2 blocks on level ground without stopping due to symptoms  - eating, dressing, toileting, walking indoors, light housework. POOR   > 4* METs -climbing > 1 flight of stairs without stopping  -walking up hill > 1-2 blocks  -scrubbing floors  -moving furniture  - golf, bowling, dancing or tennis  -running short distance MODERATE to EXCELLENT   * performance of any one of the activities     Elevated troponin likely in setting of recent hypotension, considered type II demand. No significant arrhythmia seen on admission ECG - sinus with prolonged RI interval and RBBB.       Recommendation  Proceed to surgery with known risk of 4%.   1. Patient has the above sanjuana-operative risk at this time given the information currently available.   2. Reverse Warfarin with Vit K and FFP if necessary.  3. Resume full anticoagulation (Warfarin with Heparin/LMWH bridging or DOAC) on POD #2 morning or per surgeon recommendations.

## 2020-11-13 NOTE — NURSING TRANSFER
Nursing Transfer Note      11/13/2020     Transfer To: 1055 FROM [facility] 27    Transfer via stretcher    Transfer with 2LNC to O2    Transported by escort    Medicines sent: none    Chart send with patient: Yes    Notified: Rene SHAVER    Patient reassessed at: 2837 11-13-20    Upon arrival to floor: cardiac monitor applied, patient oriented to room, call bell in reach and bed in lowest position    VSS. No complaints of pain reported. Pt able to tolerate po intake. Warm blankets given. Pt rested while in [facility].

## 2020-11-13 NOTE — ANESTHESIA PREPROCEDURE EVALUATION
11/13/2020  Aidan Santa is a 87 y.o., female.    Anesthesia Evaluation    I have reviewed the Patient Summary Reports.    I have reviewed the Nursing Notes.    I have reviewed the Medications.     Review of Systems  Anesthesia Hx:  No problems with previous Anesthesia  History of prior surgery of interest to airway management or planning: Denies Family Hx of Anesthesia complications.   Denies Personal Hx of Anesthesia complications.   Social:  Non-Smoker    Hematology/Oncology:  Hematology Normal   Oncology Normal     EENT/Dental:EENT/Dental Normal   Cardiovascular:   Hypertension    Pulmonary:  Pulmonary Normal    Renal/:   renal calculi    Hepatic/GI:  Hepatic/GI Normal    Musculoskeletal:  Musculoskeletal Normal    Neurological:  Neurology Normal    Endocrine:   Diabetes    Psych:  Psychiatric Normal           Physical Exam  General:  Well nourished    Airway/Jaw/Neck:  Airway Findings: Mouth Opening: Normal Tongue: Normal  General Airway Assessment: Adult  Mallampati: II  Jaw/Neck Findings:  Neck ROM: Normal ROM      Dental:  Dental Findings:   Chest/Lungs:  Chest/Lungs Findings: Clear to auscultation, Normal Respiratory Rate     Heart/Vascular:  Heart Findings: Rate: Normal  Rhythm: Regular Rhythm  Sounds: Normal        Mental Status:  Mental Status Findings:  Cooperative, Alert and Oriented         Anesthesia Plan  Type of Anesthesia, risks & benefits discussed:  Anesthesia Type:  general  Patient's Preference:   Intra-op Monitoring Plan: standard ASA monitors  Intra-op Monitoring Plan Comments:   Post Op Pain Control Plan: multimodal analgesia  Post Op Pain Control Plan Comments:   Induction:   IV  Beta Blocker:  Patient is not currently on a Beta-Blocker (No further documentation required).       Informed Consent: Patient understands risks and agrees with Anesthesia plan.  Questions answered.  Anesthesia consent signed with patient.  ASA Score: 3     Day of Surgery Review of History & Physical:    H&P update referred to the surgeon.         Ready For Surgery From Anesthesia Perspective.

## 2020-11-13 NOTE — ASSESSMENT & PLAN NOTE
On arrival to floor, hypoxic to 92%, up to mid 94% on 1L NC. Trop 0.103, however EKG in sinus becka with 1st deg AV block without ST elevations. Likely 2/2 demand ischemia given hypotension at OSH. Procal elevated 4.57. CXR pending, if infiltrate, consider PNA.   - f/u CXR  - prn O2 NC

## 2020-11-13 NOTE — ASSESSMENT & PLAN NOTE
She takes Triamterene-HCTZ 75-50 mg daily and verapamil 180 mg daily for hypertension.   - holding in the setting of hypotension

## 2020-11-13 NOTE — HPI
"Mrs. Aidan Santa is a 87-year-old-woman with T2DM, HTN, HLD, and asthma, who initially presented to [facility] in [place], [place] with 10/10 LLQ abdominal pain and L flank pain.    10/10 sharp abdominal pain started yesterday morning around 6:30 AM after drinking coffee. It starts at her L flank and radiates forward into her LLQ. Nothing at home helped relieve her pain and it was so bad it made her cry. Associated with nausea and chills. No fever. Normally gets up to urinate 3x a night and has noticed decreased urination frequency the past 2 days. Has had SOB for the past week on exertion. No CP, palpitations, cough or congestion.     She takes Triamterene-HCTZ and verapamil for hypertension. Takes glyburide and gabapentin for diabetes and DM neuropathy.    Per transfer note: At OSH,"Work up revealed on CT a 3 mm obstructing stone in the L UVJ and perinephric fluids that is concerning for recent rupture of the calyx or renal pelvis. She then started to become hypotensive and did respond to fluids but then eventually required levophed initiation. She quickly improved and was taken off levophed and at the time of this transfer acceptance she has been off levophed for about 1.5 hrs with BPs maintaining in the 120s and HRs in the 70s-80s range. The case was also discussed with eicu and it was felt that she would be stable for a stepdown bed at this time. She was also given a dose of ceftriaxone. Patient overall appears well. Urology on call aware and will consult."    No smoking, drug or IVDU. [place] [facility], still drives occasionally.   "

## 2020-11-13 NOTE — ASSESSMENT & PLAN NOTE
Takes glyburide and gabapentin for diabetes and DM neuropathy.  - Hg A1c  - LDSSI  - held glyburide  - continue gp

## 2020-11-13 NOTE — ASSESSMENT & PLAN NOTE
"Mrs. Aidan Santa is a 87-year-old-woman with T2DM, HTN, HLD, and asthma, who initially presented to [facility] in [place], [place] with 10/10 LLQ abdominal pain and L flank pain.    Per transfer note: At OSH,"Work up revealed on CT a 3 mm obstructing stone in the L UVJ and perinephric fluids that is concerning for recent rupture of the calyx or renal pelvis. She then started to become hypotensive and did respond to fluids but then eventually required levophed initiation. She quickly improved and was taken off levophed and at the time of this transfer acceptance she has been off levophed for about 1.5 hrs with BPs maintaining in the 120s and HRs in the 70s-80s range. The case was also discussed with eicu and it was felt that she would be stable for a stepdown bed at this time. She was also given a dose of ceftriaxone. Patient overall appears well. Urology on call aware and will consult."    On my assessment she is alert and oriented, not in acute distress, /68. Stating 94% on 1L NC. Afebrile 98.3. RR 16. HR 65. On exam she has LLQ and L flank ttp. WBC 21.69. UA with 3+ leukocytes. Lactate 2.1. UTI vs infected renal stone.  - blood cx   - started IV zosyn  - continuous NS at 75 cc/hr  - urology consulted  - NPO, ok ice-chips  "

## 2020-11-14 VITALS
BODY MASS INDEX: 33.38 KG/M2 | RESPIRATION RATE: 18 BRPM | HEART RATE: 71 BPM | HEIGHT: 65 IN | DIASTOLIC BLOOD PRESSURE: 80 MMHG | SYSTOLIC BLOOD PRESSURE: 161 MMHG | WEIGHT: 200.38 LBS | TEMPERATURE: 98 F | OXYGEN SATURATION: 89 %

## 2020-11-14 PROBLEM — N13.4 HYDROURETER ON LEFT: Status: ACTIVE | Noted: 2020-11-14

## 2020-11-14 PROBLEM — R26.81 UNSTEADY GAIT: Status: ACTIVE | Noted: 2020-11-14

## 2020-11-14 PROBLEM — N12 PYELONEPHRITIS OF LEFT KIDNEY: Status: ACTIVE | Noted: 2020-11-14

## 2020-11-14 PROBLEM — A41.9 SEPSIS: Status: ACTIVE | Noted: 2020-11-14

## 2020-11-14 LAB
ALBUMIN SERPL BCP-MCNC: 3 G/DL (ref 3.5–5.2)
ALP SERPL-CCNC: 77 U/L (ref 55–135)
ALT SERPL W/O P-5'-P-CCNC: 20 U/L (ref 10–44)
ANION GAP SERPL CALC-SCNC: 9 MMOL/L (ref 8–16)
AST SERPL-CCNC: 25 U/L (ref 10–40)
BACTERIA UR CULT: NO GROWTH
BASOPHILS # BLD AUTO: 0.05 K/UL (ref 0–0.2)
BASOPHILS NFR BLD: 0.4 % (ref 0–1.9)
BILIRUB SERPL-MCNC: 0.9 MG/DL (ref 0.1–1)
BUN SERPL-MCNC: 16 MG/DL (ref 8–23)
CALCIUM SERPL-MCNC: 8.5 MG/DL (ref 8.7–10.5)
CHLORIDE SERPL-SCNC: 103 MMOL/L (ref 95–110)
CO2 SERPL-SCNC: 26 MMOL/L (ref 23–29)
CREAT SERPL-MCNC: 0.9 MG/DL (ref 0.5–1.4)
DIFFERENTIAL METHOD: ABNORMAL
EOSINOPHIL # BLD AUTO: 0.2 K/UL (ref 0–0.5)
EOSINOPHIL NFR BLD: 1.4 % (ref 0–8)
ERYTHROCYTE [DISTWIDTH] IN BLOOD BY AUTOMATED COUNT: 12.6 % (ref 11.5–14.5)
EST. GFR  (AFRICAN AMERICAN): >60 ML/MIN/1.73 M^2
EST. GFR  (NON AFRICAN AMERICAN): 57.7 ML/MIN/1.73 M^2
GLUCOSE SERPL-MCNC: 125 MG/DL (ref 70–110)
HCT VFR BLD AUTO: 39.6 % (ref 37–48.5)
HGB BLD-MCNC: 12.9 G/DL (ref 12–16)
IMM GRANULOCYTES # BLD AUTO: 0.05 K/UL (ref 0–0.04)
IMM GRANULOCYTES NFR BLD AUTO: 0.4 % (ref 0–0.5)
LYMPHOCYTES # BLD AUTO: 1 K/UL (ref 1–4.8)
LYMPHOCYTES NFR BLD: 7.7 % (ref 18–48)
MCH RBC QN AUTO: 31.5 PG (ref 27–31)
MCHC RBC AUTO-ENTMCNC: 32.6 G/DL (ref 32–36)
MCV RBC AUTO: 97 FL (ref 82–98)
MONOCYTES # BLD AUTO: 0.8 K/UL (ref 0.3–1)
MONOCYTES NFR BLD: 5.9 % (ref 4–15)
NEUTROPHILS # BLD AUTO: 11 K/UL (ref 1.8–7.7)
NEUTROPHILS NFR BLD: 84.2 % (ref 38–73)
NRBC BLD-RTO: 0 /100 WBC
PLATELET # BLD AUTO: 116 K/UL (ref 150–350)
PMV BLD AUTO: 11.3 FL (ref 9.2–12.9)
POCT GLUCOSE: 138 MG/DL (ref 70–110)
POCT GLUCOSE: 141 MG/DL (ref 70–110)
POTASSIUM SERPL-SCNC: 3.6 MMOL/L (ref 3.5–5.1)
PROT SERPL-MCNC: 6.1 G/DL (ref 6–8.4)
RBC # BLD AUTO: 4.09 M/UL (ref 4–5.4)
SODIUM SERPL-SCNC: 138 MMOL/L (ref 136–145)
WBC # BLD AUTO: 13.09 K/UL (ref 3.9–12.7)

## 2020-11-14 PROCEDURE — 25000003 PHARM REV CODE 250: Performed by: STUDENT IN AN ORGANIZED HEALTH CARE EDUCATION/TRAINING PROGRAM

## 2020-11-14 PROCEDURE — 97110 THERAPEUTIC EXERCISES: CPT

## 2020-11-14 PROCEDURE — 97161 PT EVAL LOW COMPLEX 20 MIN: CPT

## 2020-11-14 PROCEDURE — 85025 COMPLETE CBC W/AUTO DIFF WBC: CPT

## 2020-11-14 PROCEDURE — 80053 COMPREHEN METABOLIC PANEL: CPT

## 2020-11-14 PROCEDURE — 63600175 PHARM REV CODE 636 W HCPCS: Performed by: STUDENT IN AN ORGANIZED HEALTH CARE EDUCATION/TRAINING PROGRAM

## 2020-11-14 PROCEDURE — 97530 THERAPEUTIC ACTIVITIES: CPT

## 2020-11-14 PROCEDURE — 97165 OT EVAL LOW COMPLEX 30 MIN: CPT

## 2020-11-14 PROCEDURE — 99239 PR HOSPITAL DISCHARGE DAY,>30 MIN: ICD-10-PCS | Mod: GC,,, | Performed by: INTERNAL MEDICINE

## 2020-11-14 PROCEDURE — 99239 HOSP IP/OBS DSCHRG MGMT >30: CPT | Mod: GC,,, | Performed by: INTERNAL MEDICINE

## 2020-11-14 PROCEDURE — 36415 COLL VENOUS BLD VENIPUNCTURE: CPT

## 2020-11-14 RX ORDER — CIPROFLOXACIN 500 MG/1
500 TABLET ORAL 2 TIMES DAILY
Qty: 14 TABLET | Refills: 0 | Status: SHIPPED | OUTPATIENT
Start: 2020-11-14 | End: 2020-11-21

## 2020-11-14 RX ADMIN — ACETAMINOPHEN 650 MG: 325 TABLET ORAL at 11:11

## 2020-11-14 RX ADMIN — PIPERACILLIN AND TAZOBACTAM 4.5 G: 4; .5 INJECTION, POWDER, LYOPHILIZED, FOR SOLUTION INTRAVENOUS; PARENTERAL at 03:11

## 2020-11-14 RX ADMIN — PIPERACILLIN AND TAZOBACTAM 4.5 G: 4; .5 INJECTION, POWDER, LYOPHILIZED, FOR SOLUTION INTRAVENOUS; PARENTERAL at 05:11

## 2020-11-14 RX ADMIN — POLYETHYLENE GLYCOL 3350 17 G: 17 POWDER, FOR SOLUTION ORAL at 08:11

## 2020-11-14 RX ADMIN — GABAPENTIN 300 MG: 300 CAPSULE ORAL at 08:11

## 2020-11-14 RX ADMIN — GUAIFENESIN AND DEXTROMETHORPHAN HYDROBROMIDE 1 TABLET: 600; 30 TABLET, EXTENDED RELEASE ORAL at 08:11

## 2020-11-14 NOTE — PLAN OF CARE
[facility]    HOME HEALTH ORDERS  FACE TO FACE ENCOUNTER    Patient Name: Aidan Santa  YOB: 1933    PCP: To Obtain Unable   PCP Address: None  PCP Phone Number: None  PCP Fax: None    Encounter Date: 11/14/2020    Admit to Home Health    Diagnoses:  Active Hospital Problems    Diagnosis  POA    *Left ureteral stone [N20.1]  Yes    Pyelonephritis of left kidney [N12]  Yes    Sepsis [A41.9]  Yes    Hydroureter on left [N13.4]  Yes    Urinary tract obstruction by kidney stone [N20.0, N13.8]  Yes    Diabetes [E11.9]  Yes    Shortness of breath [R06.02]  Yes    Hypertension [I10]  Yes      Resolved Hospital Problems   No resolved problems to display.       No future appointments.        I have seen and examined this patient face to face today. My clinical findings that support the need for the home health skilled services and home bound status are the following:  Weakness/numbness causing balance and gait disturbance due to Weakness/Debility making it taxing to leave home.    Allergies:  Review of patient's allergies indicates:   Allergen Reactions    Codeine Other (See Comments)     dizzy       Diet: diabetic diet: 2000 calorie    Activities: activity as tolerated    Nursing:   SN to complete comprehensive assessment including routine vital signs. Instruct on disease process and s/s of complications to report to MD. Review/verify medication list sent home with the patient at time of discharge  and instruct patient/caregiver as needed. Frequency may be adjusted depending on start of care date.    Notify MD if SBP > 160 or < 90; DBP > 90 or < 50; HR > 120 or < 50; Temp > 101      CONSULTS:    Physical Therapy to evaluate and treat. Evaluate for home safety and equipment needs; Establish/upgrade home exercise program. Perform / instruct on therapeutic exercises, gait training, transfer training, and Range of Motion.    MISCELLANEOUS CARE:  N/A    WOUND CARE  ORDERS  n/a      Medications: Review discharge medications with patient and family and provide education.      Current Discharge Medication List      START taking these medications    Details   ciprofloxacin HCl (CIPRO) 500 MG tablet Take 1 tablet (500 mg total) by mouth 2 (two) times daily. for 7 days  Qty: 14 tablet, Refills: 0         CONTINUE these medications which have NOT CHANGED    Details   acetaminophen (TYLENOL) 500 MG tablet Take 1,000 mg by mouth every evening.      aspirin (ECOTRIN) 81 MG EC tablet Take 81 mg by mouth once daily.      diphenhydrAMINE (BENADRYL) 25 mg capsule Take 25 mg by mouth as needed for Itching.      gabapentin (NEURONTIN) 300 MG capsule Take 300 mg by mouth once daily.       glyBURIDE (DIABETA) 2.5 MG tablet Take 2.5 mg by mouth once daily.       ibuprofen (ADVIL,MOTRIN) 200 MG tablet Take 400 mg by mouth as needed for Pain.       montelukast (SINGULAIR) 10 mg tablet Take 10 mg by mouth every evening.      NON FORMULARY MEDICATION Hemp gummy at night for sleep      omega-3 fatty acids/fish oil (FISH OIL-OMEGA-3 FATTY ACIDS) 300-1,000 mg capsule Take by mouth once daily.      pravastatin (PRAVACHOL) 20 MG tablet Take 20 mg by mouth once daily.      triamterene-hydrochlorothiazide 75-50 mg (MAXZIDE) 75-50 mg per tablet Take 1 tablet by mouth once daily.    Comments: .      verapamiL (CALAN-SR) 180 MG CR tablet Take 180 mg by mouth once daily.              I certify that this patient is confined to her home and needs physical therapy.

## 2020-11-14 NOTE — DISCHARGE SUMMARY
"[facility] Hospital Medicine  Discharge Summary      Patient Name: Aidan Santa  MRN: [id number]  Admission Date: 11/13/2020  Hospital Length of Stay: 1 days  Discharge Date and Time:  11/14/2020 1:02 PM  Attending Physician: Eli Thornton MD   Discharging Provider: Ricardo Arias MD  Primary Care Provider: To Obtain Unable  Hospital Medicine Team: [facility] HOSP MED 2 Ricardo Arias MD    HPI:   Mrs. Aidan Santa is a 87-year-old-woman with T2DM, HTN, HLD, and asthma, who initially presented to [facility] in [place], [place] with 10/10 LLQ abdominal pain and L flank pain.    10/10 sharp abdominal pain started yesterday morning around 6:30 AM after drinking coffee. It starts at her L flank and radiates forward into her LLQ. Nothing at home helped relieve her pain and it was so bad it made her cry. Associated with nausea and chills. No fever. Normally gets up to urinate 3x a night and has noticed decreased urination frequency the past 2 days. Has had SOB for the past week on exertion. No CP, palpitations, cough or congestion.     She takes Triamterene-HCTZ and verapamil for hypertension. Takes glyburide and gabapentin for diabetes and DM neuropathy.    Per transfer note: At OSH,"Work up revealed on CT a 3 mm obstructing stone in the L UVJ and perinephric fluids that is concerning for recent rupture of the calyx or renal pelvis. She then started to become hypotensive and did respond to fluids but then eventually required levophed initiation. She quickly improved and was taken off levophed and at the time of this transfer acceptance she has been off levophed for about 1.5 hrs with BPs maintaining in the 120s and HRs in the 70s-80s range. The case was also discussed with eicu and it was felt that she would be stable for a stepdown bed at this time. She was also given a dose of ceftriaxone. Patient overall appears well. Urology on call aware and will consult."    No smoking, drug or IVDU. [place] [facility], still drives occasionally.     Procedure(s) (LRB):  CYSTOSCOPY, WITH URETERAL STENT INSERTION (Left)  PYELOGRAM, RETROGRADE (Left)      Hospital Course:   Admitted to Hospital Medicine as a transfer from OSH on 11/13 for Urology eval of L UVJ stone w/ suspected blockage. Prior to procedure she spontaneously passed the stone, and cystoscopy did not identify any stricture so no intervention was done. She was treated for 2 days w/ Zosyn for pyelonephritis; just prior to d/c urine culture returned w/ no growth. She was sent home w/ a 7 day course of Ciprofloxacin.     Physical Exam   Constitutional: She is oriented to person, place, and time and well-developed, well-nourished, and in no distress. No distress.   HENT:   Head: Normocephalic and atraumatic.   Mouth/Throat: No oropharyngeal exudate.   Eyes: Pupils are equal, round, and reactive to light. Conjunctivae are normal. No scleral icterus.   Neck: Normal range of motion. Neck supple.   Cardiovascular: Normal rate, regular rhythm and normal heart sounds.   No murmur heard.  Pulmonary/Chest: Effort normal and breath sounds normal. No respiratory distress. She has no wheezes.   Abdominal: Soft. Bowel sounds are normal. She exhibits mass (hernia; reducible). She exhibits no distension. There is no abdominal tenderness.   Musculoskeletal: Normal range of motion.         General: No tenderness or edema.   Lymphadenopathy:     She has no cervical adenopathy.   Neurological: She is alert and oriented to person, place, and time.   Skin: Skin is warm and dry. She is not diaphoretic.   Psychiatric: Affect and judgment normal.     Consults:   Consults (From admission, onward)        Status Ordering Provider     Inpatient consult to Urology  Once     Provider:  (Not yet assigned)    Completed ELIJAH ALBA          * Left ureteral stone  Mrs. Aidan Santa is a 87-year-old-woman with T2DM, HTN, HLD, and asthma, who initially presented to [facility] in [place], [place] with 10/10 LLQ abdominal pain and L flank pain.    Per transfer note: At OSH,"Work up revealed on CT a 3 mm obstructing stone in the L UVJ and perinephric fluids that is concerning for recent rupture of the calyx or renal pelvis. She then started to become hypotensive and did respond to fluids but then eventually required levophed initiation. She quickly improved and was taken off levophed and at the time of this transfer acceptance she has been off levophed for about 1.5 hrs with BPs maintaining in the 120s and HRs in the 70s-80s range. The case was also discussed with eicu and it was felt that she would be stable for a stepdown bed at this time. She was also given a dose of ceftriaxone. Patient overall appears well. Urology on call aware and will consult."    Resolved.    - Blood and urine cx: no growth   - IV Zosyn 11/13 - 11/14  - Ciprofloxacin for 7 days post discharge  - No urologic intervention required    Pyelonephritis of left kidney  Treated for 2 days w/ IV Zosyn upon admission and 1 dose CTX at OSH  Urine and blood cultures negative  Has been afebrile w/ normalizing WBC (13)    - Ciprofloxacin for 7 days after discharge      Hypertension  She takes Triamterene-HCTZ 75-50 mg daily and verapamil 180 mg daily for hypertension.     - Held during admission    Shortness of breath  On arrival to floor, hypoxic to 92%, up to mid 94% on 1L NC. Trop 0.103, however EKG in sinus becka with 1st deg AV block without ST elevations. Likely 2/2 demand ischemia given hypotension at OSH. Procal elevated 4.57. CXR w/o concern for PNA.  Not requiring supplemental O2 to maintain adequate saturation at discharge.    Resolved.      Diabetes  Takes glyburide and gabapentin for diabetes and DM neuropathy.  A1c 6.3    Urinary tract obstruction by kidney stone  See L ureteral stone      Final Active Diagnoses:    Diagnosis Date Noted POA    PRINCIPAL PROBLEM:  Left ureteral stone [N20.1] 11/13/2020 Yes    " Pyelonephritis of left kidney [N12] 11/14/2020 Yes    Sepsis [A41.9] 11/14/2020 Yes    Hydroureter on left [N13.4] 11/14/2020 Yes    Urinary tract obstruction by kidney stone [N20.0, N13.8] 11/13/2020 Yes    Diabetes [E11.9] 11/13/2020 Yes    Shortness of breath [R06.02] 11/13/2020 Yes    Hypertension [I10] 11/13/2020 Yes      Problems Resolved During this Admission:       Discharged Condition: fair    Disposition: Home or Self Care    Follow Up:    Patient Instructions:      Notify your health care provider if you experience any of the following:  temperature >100.4     Activity as tolerated       Significant Diagnostic Studies: Labs:   CMP   Recent Labs   Lab 11/13/20  0455 11/14/20  0506    138   K 3.6 3.6    103   CO2 28 26   * 125*   BUN 23 16   CREATININE 1.4 0.9   CALCIUM 8.8 8.5*   PROT 6.4 6.1   ALBUMIN 3.3* 3.0*   BILITOT 0.8 0.9   ALKPHOS 50* 77   AST 25 25   ALT 25 20   ANIONGAP 9 9   ESTGFRAFRICA 39.0* >60.0   EGFRNONAA 33.8* 57.7*   , CBC   Recent Labs   Lab 11/13/20  0455 11/14/20  0506   WBC 21.69* 13.09*   HGB 13.8 12.9   HCT 41.4 39.6   * 116*   , A1C:   Recent Labs   Lab 11/13/20  0455   HGBA1C 6.3*    and All labs within the past 24 hours have been reviewed  Microbiology:   Blood Culture   Lab Results   Component Value Date    LABBLOO No Growth to date 11/13/2020    LABBLOO No Growth to date 11/13/2020    LABBLOO No Growth to date 11/13/2020    LABBLOO No Growth to date 11/13/2020    and Urine Culture    Lab Results   Component Value Date    LABURIN No growth 11/13/2020       Pending Diagnostic Studies:     None         Medications:  Reconciled Home Medications:      Medication List      START taking these medications    ciprofloxacin HCl 500 MG tablet  Commonly known as: CIPRO  Take 1 tablet (500 mg total) by mouth 2 (two) times daily. for 7 days     walker Misc  1 Units by Misc.(Non-Drug; Combo Route) route once daily.        CONTINUE taking these medications     acetaminophen 500 MG tablet  Commonly known as: TYLENOL  Take 1,000 mg by mouth every evening.     aspirin 81 MG EC tablet  Commonly known as: ECOTRIN  Take 81 mg by mouth once daily.     diphenhydrAMINE 25 mg capsule  Commonly known as: BENADRYL  Take 25 mg by mouth as needed for Itching.     fish oil-omega-3 fatty acids 300-1,000 mg capsule  Take by mouth once daily.     gabapentin 300 MG capsule  Commonly known as: NEURONTIN  Take 300 mg by mouth once daily.     glyBURIDE 2.5 MG tablet  Commonly known as: DIABETA  Take 2.5 mg by mouth once daily.     ibuprofen 200 MG tablet  Commonly known as: ADVIL,MOTRIN  Take 400 mg by mouth as needed for Pain.     montelukast 10 mg tablet  Commonly known as: SINGULAIR  Take 10 mg by mouth every evening.     NON FORMULARY MEDICATION  Hemp gummy at night for sleep     pravastatin 20 MG tablet  Commonly known as: PRAVACHOL  Take 20 mg by mouth once daily.     triamterene-hydrochlorothiazide 75-50 mg 75-50 mg per tablet  Commonly known as: MAXZIDE  Take 1 tablet by mouth once daily.     verapamiL 180 MG CR tablet  Commonly known as: CALAN-SR  Take 180 mg by mouth once daily.            Indwelling Lines/Drains at time of discharge:   Lines/Drains/Airways     None                 Time spent on the discharge of patient: 35 minutes  Patient was seen and examined on the date of discharge and determined to be suitable for discharge.      Ricardo Arias MD  Department of Hospital Medicine  [facility]

## 2020-11-14 NOTE — PLAN OF CARE
11/14/20 1607   Final Note   Assessment Type Final Discharge Note   Anticipated Discharge Disposition Home-Health   Post-Acute Status   Post-Acute Authorization HME;Home Health   HME Status Pending Delivery Home  ([facility] will contact payor for apporval Monday, then deliver to pt's home)   Home Health Status Referrals Sent  (Case Management will fup with pt when HH accepts)   Discharge Delays None known at this time     Attending MD approved pt to discharge as planned.     Additional referrals for HH sent via [employer] @ 4:30 PM.      Ksenia Wood RN  PRN  - [facility]  [id number]

## 2020-11-14 NOTE — PLAN OF CARE
Discharge Recommendation: HHPT.    DME Recommendation: rollator    Evaluation completed today. PT goals appropriate.    Patient is safe to perform ambulation 2-3x/day with family or nursing staff.    Elizabeth Hernandez, PT, DPT  2020  Pager: [phone]        Problem: Physical Therapy Goal  Goal: Physical Therapy Goal  Description: Goals to be met by: 2020     Patient will increase functional independence with mobility by performin. Sit to stand transfer with Supervision  2. Bed to chair transfer with Supervision using LRAD  3. Gait  x 50 feet with Stand-by Assistance using RW or LRAD.   4. Ascend/descend 1 stair with no Handrails Contact Guard Assistance using LRAD.   5. Lower extremity exercise program x30 reps per handout, with independence    Outcome: Ongoing, Progressing

## 2020-11-14 NOTE — HOSPITAL COURSE
Admitted to Hospital Medicine as a transfer from [facility] on 11/13 for Urology eval of L UVJ stone w/ suspected blockage. Prior to procedure she spontaneously passed the stone, and cystoscopy did not identify any stricture so no intervention was done. She was treated for 2 days w/ Zosyn for pyelonephritis; just prior to d/c urine culture returned w/ no growth. She was sent home w/ a 7 day course of Ciprofloxacin.

## 2020-11-14 NOTE — ASSESSMENT & PLAN NOTE
Treated for 2 days w/ IV Zosyn upon admission and 1 dose CTX at OSH  Urine and blood cultures negative  Has been afebrile w/ normalizing WBC (13)    - Ciprofloxacin for 7 days after discharge

## 2020-11-14 NOTE — DISCHARGE INSTRUCTIONS
During your hospital stay you were found to have a urinary stone, however the stone passed on its own and no intervention was needed. You were also treated for a kidney infection with the IV antibiotic Zosyn. You will need to take a pill antibiotic, Ciprofloxacin, for 7 days after discharge. Please finish all of the pills provided following the instructions on the bottle.    Please call your primary care provider by your home to set up a hospital follow up appointment for next week.     Please return to the hospital if you have any return of symptoms, fevers, chills, or pain with urination.

## 2020-11-14 NOTE — PT/OT/SLP EVAL
Occupational Therapy   Evaluation    Name: Aidan Santa  MRN: [id number]  Admitting Diagnosis:  Left ureteral stone 1 Day Post-Op    Recommendations:     Discharge Recommendations: home  Discharge Equipment Recommendations:  rollator  Barriers to discharge:  None    Assessment:     Aidan Santa is a 87 y.o. female with a medical diagnosis of Left ureteral stone. Pt. currently demonstrates decreased (I) with ADLs, functional mobility & t/fs decreased overall endurance and balance and would benefit from skilled OT services to address these deficits and to facilitate improving (I) with daily tasks. Performance deficits affecting function: weakness, impaired endurance, impaired self care skills, impaired functional mobilty, gait instability, impaired balance, decreased coordination.      Rehab Prognosis: Good; patient would benefit from acute skilled OT services to address these deficits and reach maximum level of function.       Plan:     Patient to be seen 3 x/week to address the above listed problems via self-care/home management, therapeutic activities, therapeutic exercises  · Plan of Care Expires: 12/14/20  · Plan of Care Reviewed with: patient, daughter    Subjective     Occupational Profile:  Living Environment: Pt lives with her daughter in a [facility] with 1 step to enter and a tub setup.  Previous level of function: Independent - very active.  Equipment Used at Home:  cane, straight, wheelchair, shower chair, grab bar  Assistance upon Discharge: daughter    Pain/Comfort:  · Pain Rating 1: 0/10    Patients cultural, spiritual, Methodist conflicts given the current situation:      Objective:     Communicated with: rn prior to session.  Patient found supine with peripheral IV upon OT entry to room.    General Precautions: Standard, fall     Occupational Performance:    Bed Mobility:    · Up in chair.    Functional Mobility/Transfers:  · Patient completed Sit <> Stand Transfer with stand by assistance  with  no  assistive device   · Functional Mobility: Pt walked ~ 60' x 2 HHA.    Activities of Daily Living:  · Feeding:  independence  · Grooming: supervision seated  · Lower Body Dressing: stand by assistance     Cognitive/Visual Perceptual:  Cognitive/Psychosocial Skills:     -       Oriented to: Person, Place, Time and Situation   -       Safety awareness/insight to disability: intact     Physical Exam:  BUE AROM/MMT: WNL    AMPAC 6 Click ADL:  AMPAC Total Score: 21    Treatment & Education:  UE ROM/MMT  Bed mobility training / assessment  Functional mobility assessment  Sit/standing balance assessment  Educated on importance of sitting OOB in bedside chair to promote increased strength, endurance & breathing.  Discussed OT POC / Post-acute plan  Education:    Patient left up in chair with all lines intact and call button in reach    GOALS:   Multidisciplinary Problems     Occupational Therapy Goals        Problem: Occupational Therapy Goal    Goal Priority Disciplines Outcome Interventions   Occupational Therapy Goal     OT, PT/OT Ongoing, Progressing    Description: Goals to be met by: 11/21/20    Patient will increase functional independence with ADLs by performing:    LE Dressing with Set-up Assistance.  Grooming while standing at sink with Set-up Assistance.  Toileting from toilet with Set-up Assistance for hygiene and clothing management.   Supine to sit with [place].  Toilet transfer to toilet with Modified [place].                     History:     No past medical history on file.    No past surgical history on file.    Time Tracking:     OT Date of Treatment: 11/14/20  OT Start Time: 0927  OT Stop Time: 0945  OT Total Time (min): 18 min    Billable Minutes:Evaluation 8  Therapeutic Activity 10    AIYANA Diaz  11/14/2020

## 2020-11-14 NOTE — PT/OT/SLP EVAL
Physical Therapy Co-Evaluation and Co-Treatment    Patient Name:  Aidan Santa   MRN:  [id number]  Admit Date: 11/13/2020  Admitting Diagnosis:  Left ureteral stone   Length of Stay: 1 days  Recent Surgery: Procedure(s) (LRB):  CYSTOSCOPY, WITH URETERAL STENT INSERTION (Left)  PYELOGRAM, RETROGRADE (Left) 1 Day Post-Op    Recommendations:     Discharge Recommendations:  home health PT   Discharge Equipment Recommendations: rollator   Barriers to discharge: None    Assessment:     Aidan Santa is a 87 y.o. female admitted with a medical diagnosis of Left ureteral stone.  She presents with the following impairments/functional limitations: weakness, impaired endurance, impaired self care skills, impaired functional mobilty, gait instability, impaired cardiopulmonary response to activity, impaired balance. Patient tolerated initial evaluation well today. Patient presents with good functional strength on this date and was primarily limited by pt's impaired endurance and activity tolerance. Patient is functioning below baseline ability and will continue to benefit from skilled PT services during this hospital admit to continue to improve transfer ability and efficiency as well as continue to progress pt's ambulation distance and cardiopulmonary endurance to maximize pt's functional independence and return to PLOF. After discharge pt will benefit from HHPT to further progress functional mobility and cardiopulmonary endurance as well as for home safety and energy conservation techniques.    Rehab Prognosis: Good; patient would benefit from acute skilled PT services to address these deficits and reach maximum level of function.      Plan:     During this hospitalization, patient to be seen 3 x/week to address the identified rehab impairments via gait training, therapeutic activities, therapeutic exercises and progress towards the established goals.    · Plan of Care Expires:  12/14/20    Subjective     RN notified prior to  session. Daughter present upon PT entrance into room.    Chief Complaint: Patient hoping to go home today  Patient/Family Comments/goals: go home, continue gardening  Pain/Comfort:  · Pain Rating 1: 0/10  · Pain Rating Post-Intervention 1: 0/10    Living Environment:  Patient lives with daughter in a single family home with 1 NOLAN with a tub/shower combo and grab bars.   Prior Level of Function: Patient reports being independent with mobility & with ADLs. Patient uses DME as follows: cane, straight, wheelchair, walker, rolling, shower chair. DME owned (not currently used): rolling walker, single point cane and wheelchair.  Roles/Repsonsibilities: Hand Dominance: right Work: no - retired . Drive: no. Managing Medicines/Managing Home: yes. Hobbies: enjoys gardening.    Patient reports they will have assistance from daughter upon discharge.    Objective:     Additional staff present: OT for evaluation; OT for cotx due to pt's multiple medical comorbidities and functional deficits req'ing two skilled therapists to appropriately progress pt's musculoskeletal strength and endurance while facilitating neuromuscular postural balance and control and accommodating for patient's activity tolerance.     Patient found up in chair with: peripheral IV     General Precautions: Standard, Cardiac fall   Orthopedic Precautions:N/A   Braces: N/A   Body mass index is 33.35 kg/m².  Oxygen Device: Room Air    Exams:  · Mental Status: Patient is AxOx4 and follows all multi-step verbal commands. Pt is Alert and Cooperative during session.  · Skin Integrity: Visible skin intact  · Edema: None noted   · Sensation: Intact  · Hearing: Intact  · Vision:  Intact  · Postural Assessment: slouched posture and rounded shoulders  · Range of Motion:  · RUE: WFL  · LUE: WFL  · RLE: WFL  · LLE: WFL  · Strength Exam:  Lower Extremity Strength: BLE WFL    Outcome Measures:  AM-PAC 6 CLICK MOBILITY  Turning over in bed (including adjusting  bedclothes, sheets and blankets)?: 4  Sitting down on and standing up from a chair with arms (e.g., wheelchair, bedside commode, etc.): 3  Moving from lying on back to sitting on the side of the bed?: 4  Moving to and from a bed to a chair (including a wheelchair)?: 3  Need to walk in hospital room?: 3  Climbing 3-5 steps with a railing?: 3  Basic Mobility Total Score: 20     Functional Mobility:    Bed Mobility:   · Pt found/returned to bedside chair    Transfers:   · Sit <> Stand Transfer: stand by assistance with no assistive device   · Stand <> Sit Transfer: stand by assistance with no assistive device   · [id number] from bedside chair    Standing Balance:   Assistance Level Required: Stand-by Assistance   Patient used: no assistive device     Gait:   · Patient ambulated: 120 ft   · Patient required: standy by assistance and contact guard  · Patient used:  hand-held assist   · Gait Pattern observed: reciprocal gait  · Gait Deviation(s): decreased step length, wide base of support, steady gait, shuffle gait and decreased rachael  · Impairments due to: decreased strength and decreased endurance  · all lines remained intact throughout ambulation trial  · Mask donned for out of room ambulation  · Comments: Patient initially with CGA progressing to SBA with increased time and practice. Pt with decreased foot clearance and slow rachael to conserve energy as well as due to impaired balance.    Education:   Time provided for education, counseling and discussion of health disposition in regards to patient's current status   All questions answered within PT scope of practice and to patient's satisfaction   PT role in POC to address current functional deficits   Pt educated on proper body mechanics, safety techniques, and energy conservation with PT facilitation and cueing throughout session   Call nursing/pct to transfer to chair/use bathroom. Pt stated understanding.   Whiteboard updated with pt's current mobility  status documented above   Safe to perform step transfer to/from chair/bedside commode CGA and HHA w/ nursing/PCT present   Pt to ambulate 2-3x/day CGA and HHA with nsg/family in order to maintain functional mobility   Importance of OOB tolerance 8-10 hrs/day to improve lung ventilation and expansion as well as strengthen postural musculature    Patient left up in chair with all lines intact, call button in reach, RN notified and daughter present.    GOALS:   Multidisciplinary Problems     Physical Therapy Goals        Problem: Physical Therapy Goal    Goal Priority Disciplines Outcome Goal Variances Interventions   Physical Therapy Goal     PT, PT/OT Ongoing, Progressing     Description: Goals to be met by: 2020     Patient will increase functional independence with mobility by performin. Sit to stand transfer with Supervision  2. Bed to chair transfer with Supervision using LRAD  3. Gait  x 50 feet with Stand-by Assistance using RW or LRAD.   4. Ascend/descend 1 stair with no Handrails Contact Guard Assistance using LRAD.   5. Lower extremity exercise program x30 reps per handout, with independence                     History:     No past medical history on file.    No past surgical history on file.    Time Tracking:     PT Received On: 20  PT Start Time: 927     PT Stop Time: 946  PT Total Time (min): 19 min     Billable Minutes: Evaluation 10 and Therapeutic Exercise 9    Elizabeth Hernandez PT, DPT  2020  Pager: [phone]

## 2020-11-14 NOTE — PLAN OF CARE
POC reviewed with pt. Verbalized understanding. AAOX'4. VSS on 2L NC. Pt on diabetic diet and tolerating well. No c/o pain,nausea,or vomiting. No acute events. Bed in lowest position with call light within reach. WCTM.

## 2020-11-17 LAB
COMPN STONE: NORMAL
SPECIMEN SOURCE: NORMAL
STONE ANALYSIS IR-IMP: NORMAL

## 2020-11-18 LAB
BACTERIA BLD CULT: NORMAL
BACTERIA BLD CULT: NORMAL

## 2020-11-27 ENCOUNTER — TELEPHONE (OUTPATIENT)
Dept: ADMINISTRATIVE | Facility: CLINIC | Age: 85
End: 2020-11-27

## 2020-11-30 ENCOUNTER — TELEPHONE (OUTPATIENT)
Dept: ADMINISTRATIVE | Facility: CLINIC | Age: 85
End: 2020-11-30

## 2020-12-02 ENCOUNTER — TELEPHONE (OUTPATIENT)
Dept: ADMINISTRATIVE | Facility: CLINIC | Age: 85
End: 2020-12-02

## 2025-04-03 NOTE — H&P
"[facility] Hospital Medicine  History & Physical    Patient Name: Aidan Santa  MRN: [id number]  Admission Date: 11/13/2020  Attending Physician: Eli Thornton MD   Primary Care Provider: To Obtain Unable    Hospital Medicine Team: [facility] 2 Alysha Bray MD     Patient information was obtained from patient and ER records.     Subjective:     Principal Problem:Left ureteral stone    Chief Complaint: No chief complaint on file.       HPI: Mrs. Aidan Santa is a 87-year-old-woman with T2DM, HTN, HLD, and asthma, who initially presented to [facility] in [place], [place] with 10/10 LLQ abdominal pain and L flank pain.    10/10 sharp abdominal pain started yesterday morning around 6:30 AM after drinking coffee. It starts at her L flank and radiates forward into her LLQ. Nothing at home helped relieve her pain and it was so bad it made her cry. Associated with nausea and chills. No fever. Normally gets up to urinate 3x a night and has noticed decreased urination frequency the past 2 days. Has had SOB for the past week on exertion. No CP, palpitations, cough or congestion.     She takes Triamterene-HCTZ and verapamil for hypertension. Takes glyburide and gabapentin for diabetes and DM neuropathy.    Per transfer note: At OSH,"Work up revealed on CT a 3 mm obstructing stone in the L UVJ and perinephric fluids that is concerning for recent rupture of the calyx or renal pelvis. She then started to become hypotensive and did respond to fluids but then eventually required levophed initiation. She quickly improved and was taken off levophed and at the time of this transfer acceptance she has been off levophed for about 1.5 hrs with BPs maintaining in the 120s and HRs in the 70s-80s range. The case was also discussed with eicu and it was felt that she would be stable for a stepdown bed at this time. She was also given a dose of ceftriaxone. Patient overall appears well. Urology on call aware and " Shoulder Arthroscopy Follow-up  Tammi Ball is here for follow up after left shoulder arthroscopic surgery. Surgery date was 2/20/25. Findings at surgery: large rotator cuff tear, long biceps tendon fraying, Grade 1 chondromlacia glenoid. Pain is controlled with current analgesics.  Medication(s) being used: Norco. The patient's pain is rated at 3/10.  She has been in a sling and non-weightbearing as instructed. She has been to PT at [facility].  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  Patient was seen with the  today.      Physical Examination:  Ht 1.676 m (5' 6\")   Wt 124.7 kg (275 lb)   LMP 01/11/2010 Comment: 2004  BMI 44.39 kg/m²    Patient is awake, alert, and in no acute distress.  The incisions are healed.        Assessment:   6 weeks status post left shoulder arthroscopy, arthroscopy, subacromial decompression, rotator cuff repair  Bipolar disorder  ADHD  Class 3 obesity  HTN  Sleep apnea      Plan:   Continue PT.    The patient may advance their weight-bearing.    Discontinue sling.    Refill pain medications as needed. A prescription monitoring report was reviewed for the patient.  Prescription for new show refill E scribed.    OTC NSAIDs as needed.    Ice as needed.    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  She can return to work on 4/14/2025    Follow up in 2 months for evaluation of progress or prn if problems.          Kai Manuel MD  Orthopaedic Surgery and Sports Medicine     Disclaimer:  This note was generated with use of a verbal recognition program and an attempt was made to check for errors.  It is possible that there are still dictated errors within this office note.  If so, please bring any significant errors to my attention for an addendum.  All efforts were made to ensure that this office note is accurate.       "will consult."    No smoking, drug or IVDU. [place] [facility], still drives occasionally.     No past medical history on file.    No past surgical history on file.    Review of patient's allergies indicates:   Allergen Reactions    Codeine Other (See Comments)     dizzy       No current facility-administered medications on file prior to encounter.      Current Outpatient Medications on File Prior to Encounter   Medication Sig    aspirin (ECOTRIN) 81 MG EC tablet Take 81 mg by mouth once daily.    diphenhydrAMINE (BENADRYL) 25 mg capsule Take 25 mg by mouth as needed for Itching.    gabapentin (NEURONTIN) 300 MG capsule Take 300 mg by mouth 3 (three) times daily.    glyBURIDE (DIABETA) 2.5 MG tablet Take 2.5 mg by mouth 2 (two) times daily.    ibuprofen (ADVIL,MOTRIN) 200 MG tablet Take 200 mg by mouth as needed for Pain.    montelukast (SINGULAIR) 10 mg tablet Take 10 mg by mouth every evening.    omega-3 fatty acids/fish oil (FISH OIL-OMEGA-3 FATTY ACIDS) 300-1,000 mg capsule Take by mouth once daily.    pravastatin (PRAVACHOL) 20 MG tablet Take 20 mg by mouth once daily.    triamterene-hydrochlorothiazide 75-50 mg (MAXZIDE) 75-50 mg per tablet Take 1 tablet by mouth once daily.    verapamiL (VERELAN) 100 MG CPCT Take 180 mg by mouth once daily.     Family History     None        Tobacco Use    Smoking status: Not on file   Substance and Sexual Activity    Alcohol use: Not on file    Drug use: Not on file    Sexual activity: Not on file     Review of Systems   Constitutional: Positive for chills. Negative for fever.   HENT: Negative for congestion, rhinorrhea and sore throat.    Eyes: Negative for redness and visual disturbance.   Respiratory: Positive for shortness of breath. Negative for cough, wheezing and stridor.    Cardiovascular: Negative for chest pain and leg swelling.   Gastrointestinal: Positive for abdominal distention, abdominal pain (LLQ) and nausea. Negative for diarrhea " and vomiting.   Endocrine: Negative for polyuria.   Genitourinary: Positive for flank pain (L) and frequency (decreased). Negative for dysuria.   Musculoskeletal: Negative for arthralgias and back pain.   Skin: Negative for rash.   Neurological: Negative for weakness, light-headedness and headaches.   Hematological: Does not bruise/bleed easily.   Psychiatric/Behavioral: Negative for agitation and confusion.     Objective:     Vital Signs (Most Recent):  Temp: 98.3 °F (36.8 °C) (11/13/20 0348)  Pulse: 82 (11/13/20 0348)  Resp: 20 (11/13/20 0348)  BP: (!) 150/68 (11/13/20 0348)  SpO2: (!) 94 % (11/13/20 0400) Vital Signs (24h Range):  Temp:  [98.3 °F (36.8 °C)-99 °F (37.2 °C)] 98.3 °F (36.8 °C)  Pulse:  [] 82  Resp:  [19-22] 20  SpO2:  [92 %-95 %] 94 %  BP: ()/(50-68) 150/68     Weight: 90.9 kg (200 lb 6.4 oz)  Body mass index is 33.35 kg/m².    Physical Exam  Constitutional:       General: She is not in acute distress.     Appearance: She is obese. She is not ill-appearing, toxic-appearing or diaphoretic.   HENT:      Head: Normocephalic and atraumatic.      Nose: No congestion or rhinorrhea.      Mouth/Throat:      Mouth: Mucous membranes are dry.      Pharynx: No oropharyngeal exudate.   Eyes:      General: No scleral icterus.     Conjunctiva/sclera: Conjunctivae normal.   Neck:      Musculoskeletal: Normal range of motion. No neck rigidity.   Cardiovascular:      Rate and Rhythm: Normal rate and regular rhythm.      Pulses: Normal pulses.      Heart sounds: Normal heart sounds. No murmur. No friction rub. No gallop.    Pulmonary:      Effort: Pulmonary effort is normal. No respiratory distress.      Breath sounds: Normal breath sounds. No stridor. No wheezing, rhonchi or rales.   Abdominal:      General: Abdomen is flat. Bowel sounds are normal. There is distension.      Palpations: Abdomen is soft.      Tenderness: There is abdominal tenderness (LLQ). There is left CVA tenderness. There is no right  "CVA tenderness, guarding or rebound.   Musculoskeletal: Normal range of motion.      Right lower leg: No edema.      Left lower leg: No edema.   Skin:     Coloration: Skin is not pale.      Findings: No erythema.   Neurological:      General: No focal deficit present.      Mental Status: She is alert and oriented to person, place, and time. Mental status is at baseline.      Sensory: No sensory deficit.      Motor: No weakness.   Psychiatric:         Mood and Affect: Mood normal.         Behavior: Behavior normal.             Significant Labs:   CBC:   Recent Labs   Lab 11/13/20  0455   WBC 21.69*   HGB 13.8   HCT 41.4   *     CMP:   Recent Labs   Lab 11/13/20 0455      K 3.6      CO2 28   *   BUN 23   CREATININE 1.4   CALCIUM 8.8   PROT 6.4   ALBUMIN 3.3*   BILITOT 0.8   ALKPHOS 50*   AST 25   ALT 25   ANIONGAP 9   EGFRNONAA 33.8*     Coagulation:   Recent Labs   Lab 11/13/20 0455   INR 1.0     Lactic Acid:   Recent Labs   Lab 11/13/20 0455   LACTATE 2.1     Urine Studies:   Recent Labs   Lab 11/13/20  0607   COLORU Yellow   APPEARANCEUA Hazy*   PHUR 5.0   SPECGRAV 1.020   PROTEINUA Negative   GLUCUA Negative   KETONESU Negative   BILIRUBINUA Negative   OCCULTUA 3+*   NITRITE Negative   LEUKOCYTESUR 3+*   RBCUA 62*   WBCUA 80*   SQUAMEPITHEL 1     Trop elevated at 0.103.     EKG in sinus rhythm with 1st degree AV block.    Significant Imaging: I have reviewed all pertinent imaging results/findings within the past 24 hours.    Assessment/Plan:     * Left ureteral stone  Mrs. Aidan Santa is a 87-year-old-woman with T2DM, HTN, HLD, and asthma, who initially presented to [facility] in [place], [place] with 10/10 LLQ abdominal pain and L flank pain.    Per transfer note: At OSH,"Work up revealed on CT a 3 mm obstructing stone in the L UVJ and perinephric fluids that is concerning for recent rupture of the calyx or renal pelvis. She then started to become hypotensive and did respond to " "fluids but then eventually required levophed initiation. She quickly improved and was taken off levophed and at the time of this transfer acceptance she has been off levophed for about 1.5 hrs with BPs maintaining in the 120s and HRs in the 70s-80s range. The case was also discussed with eicu and it was felt that she would be stable for a stepdown bed at this time. She was also given a dose of ceftriaxone. Patient overall appears well. Urology on call aware and will consult."    On my assessment she is alert and oriented, not in acute distress, /68. Stating 94% on 1L NC. Afebrile 98.3. RR 16. HR 65. On exam she has LLQ and L flank ttp. WBC 21.69. UA with 3+ leukocytes. Lactate 2.1. UTI vs infected renal stone.  - blood cx   - started IV zosyn  - continuous NS at 75 cc/hr  - urology consulted  - NPO, ok ice-chips    Shortness of breath  On arrival to floor, hypoxic to 92%, up to mid 94% on 1L NC. Trop 0.103, however EKG in sinus bceka with 1st deg AV block without ST elevations. Likely 2/2 demand ischemia given hypotension at OSH. Procal elevated 4.57. CXR pending, if infiltrate, consider PNA.   - f/u CXR  - prn O2 NC    Hypertension  She takes Triamterene-HCTZ 75-50 mg daily and verapamil 180 mg daily for hypertension.   - holding in the setting of hypotension     Diabetes  Takes glyburide and gabapentin for diabetes and DM neuropathy.  - Hg A1c  - LDSSI  - held glyburide  - continue gp    Urinary tract obstruction by kidney stone  See L ureteral stone.    VTE Risk Mitigation (From admission, onward)         Ordered     IP VTE HIGH RISK PATIENT  Once      11/13/20 0519     Place sequential compression device  Until discontinued      11/13/20 0519                   Alysha Bray MD  Department of Hospital Medicine   [facility]  "

## (undated) DEVICE — TRAY CYSTO BASIN

## (undated) DEVICE — UNDERGLOVES BIOGEL PI SIZE 7.5

## (undated) DEVICE — SOL IRR WATER STRL 3000 ML

## (undated) DEVICE — UNDERGLOVES BIOGEL PI SZ 7 LF

## (undated) DEVICE — PACK CYSTO

## (undated) DEVICE — GUIDE WIRE MOTION .035 X 150CM

## (undated) DEVICE — CATH POLLACK OPEN-END FLEXI-TI